# Patient Record
Sex: MALE | Race: WHITE | Employment: FULL TIME | URBAN - METROPOLITAN AREA
[De-identification: names, ages, dates, MRNs, and addresses within clinical notes are randomized per-mention and may not be internally consistent; named-entity substitution may affect disease eponyms.]

---

## 2018-07-02 ENCOUNTER — TRANSFERRED RECORDS (OUTPATIENT)
Dept: HEALTH INFORMATION MANAGEMENT | Facility: CLINIC | Age: 73
End: 2018-07-02

## 2018-07-03 ENCOUNTER — HOSPITAL ENCOUNTER (INPATIENT)
Facility: CLINIC | Age: 73
LOS: 6 days | Discharge: HOME | DRG: 444 | End: 2018-07-09
Attending: INTERNAL MEDICINE | Admitting: FAMILY MEDICINE
Payer: COMMERCIAL

## 2018-07-03 DIAGNOSIS — K81.0 ACUTE CHOLECYSTITIS: Primary | ICD-10-CM

## 2018-07-03 PROBLEM — K21.9 GERD (GASTROESOPHAGEAL REFLUX DISEASE): Status: ACTIVE | Noted: 2018-07-03

## 2018-07-03 PROBLEM — J84.9 CHRONIC INTERSTITIAL LUNG DISEASE (H): Status: ACTIVE | Noted: 2018-07-02

## 2018-07-03 PROBLEM — K81.9 CHOLECYSTITIS: Status: ACTIVE | Noted: 2018-07-03

## 2018-07-03 PROCEDURE — 93005 ELECTROCARDIOGRAM TRACING: CPT

## 2018-07-03 PROCEDURE — 93010 ELECTROCARDIOGRAM REPORT: CPT | Performed by: INTERNAL MEDICINE

## 2018-07-03 PROCEDURE — 25000128 H RX IP 250 OP 636: Performed by: STUDENT IN AN ORGANIZED HEALTH CARE EDUCATION/TRAINING PROGRAM

## 2018-07-03 PROCEDURE — 25800025 ZZH RX 258: Performed by: STUDENT IN AN ORGANIZED HEALTH CARE EDUCATION/TRAINING PROGRAM

## 2018-07-03 PROCEDURE — 12000008 ZZH R&B INTERMEDIATE UMMC

## 2018-07-03 RX ORDER — IPRATROPIUM BROMIDE AND ALBUTEROL SULFATE 2.5; .5 MG/3ML; MG/3ML
3 SOLUTION RESPIRATORY (INHALATION) DAILY PRN
Status: DISCONTINUED | OUTPATIENT
Start: 2018-07-03 | End: 2018-07-09 | Stop reason: HOSPADM

## 2018-07-03 RX ORDER — ONDANSETRON 4 MG/1
4 TABLET, ORALLY DISINTEGRATING ORAL EVERY 6 HOURS PRN
Status: DISCONTINUED | OUTPATIENT
Start: 2018-07-03 | End: 2018-07-09 | Stop reason: HOSPADM

## 2018-07-03 RX ORDER — BRIMONIDINE TARTRATE 1 MG/ML
1 SOLUTION/ DROPS OPHTHALMIC 2 TIMES DAILY
COMMUNITY
Start: 2016-02-09

## 2018-07-03 RX ORDER — CLOTRIMAZOLE AND BETAMETHASONE DIPROPIONATE 10; .64 MG/G; MG/G
CREAM TOPICAL 2 TIMES DAILY PRN
Status: DISCONTINUED | OUTPATIENT
Start: 2018-07-03 | End: 2018-07-09 | Stop reason: HOSPADM

## 2018-07-03 RX ORDER — ONDANSETRON 2 MG/ML
4 INJECTION INTRAMUSCULAR; INTRAVENOUS EVERY 6 HOURS PRN
Status: DISCONTINUED | OUTPATIENT
Start: 2018-07-03 | End: 2018-07-09 | Stop reason: HOSPADM

## 2018-07-03 RX ORDER — LATANOPROST 50 UG/ML
1 SOLUTION/ DROPS OPHTHALMIC AT BEDTIME
Status: DISCONTINUED | OUTPATIENT
Start: 2018-07-03 | End: 2018-07-04

## 2018-07-03 RX ORDER — DEXTROSE MONOHYDRATE, SODIUM CHLORIDE, AND POTASSIUM CHLORIDE 50; 1.49; 4.5 G/1000ML; G/1000ML; G/1000ML
INJECTION, SOLUTION INTRAVENOUS CONTINUOUS
Status: DISCONTINUED | OUTPATIENT
Start: 2018-07-03 | End: 2018-07-05

## 2018-07-03 RX ORDER — CLOTRIMAZOLE AND BETAMETHASONE DIPROPIONATE 10; .64 MG/G; MG/G
CREAM TOPICAL 2 TIMES DAILY PRN
COMMUNITY
Start: 2018-05-01

## 2018-07-03 RX ORDER — NALOXONE HYDROCHLORIDE 0.4 MG/ML
.1-.4 INJECTION, SOLUTION INTRAMUSCULAR; INTRAVENOUS; SUBCUTANEOUS
Status: DISCONTINUED | OUTPATIENT
Start: 2018-07-03 | End: 2018-07-09 | Stop reason: HOSPADM

## 2018-07-03 RX ORDER — LATANOPROST 50 UG/ML
1 SOLUTION/ DROPS OPHTHALMIC AT BEDTIME
Status: ON HOLD | COMMUNITY
Start: 2015-07-06 | End: 2018-07-04

## 2018-07-03 RX ORDER — HYDROMORPHONE HCL/0.9% NACL/PF 0.2MG/0.2
0.2 SYRINGE (ML) INTRAVENOUS
Status: DISCONTINUED | OUTPATIENT
Start: 2018-07-03 | End: 2018-07-09 | Stop reason: HOSPADM

## 2018-07-03 RX ORDER — BRIMONIDINE TARTRATE 1 MG/ML
1 SOLUTION/ DROPS OPHTHALMIC DAILY
Status: DISCONTINUED | OUTPATIENT
Start: 2018-07-04 | End: 2018-07-09 | Stop reason: HOSPADM

## 2018-07-03 RX ORDER — PIPERACILLIN SODIUM, TAZOBACTAM SODIUM 3; .375 G/15ML; G/15ML
3.38 INJECTION, POWDER, LYOPHILIZED, FOR SOLUTION INTRAVENOUS EVERY 8 HOURS SCHEDULED
Status: DISCONTINUED | OUTPATIENT
Start: 2018-07-03 | End: 2018-07-04

## 2018-07-03 RX ADMIN — PIPERACILLIN SODIUM AND TAZOBACTAM SODIUM 3.38 G: 3; .375 INJECTION, POWDER, LYOPHILIZED, FOR SOLUTION INTRAVENOUS at 20:46

## 2018-07-03 RX ADMIN — POTASSIUM CHLORIDE, DEXTROSE MONOHYDRATE AND SODIUM CHLORIDE: 150; 5; 450 INJECTION, SOLUTION INTRAVENOUS at 20:46

## 2018-07-03 RX ADMIN — Medication 0.2 MG: at 22:52

## 2018-07-03 RX ADMIN — Medication 0.2 MG: at 20:55

## 2018-07-03 ASSESSMENT — ENCOUNTER SYMPTOMS
EYES NEGATIVE: 1
CONSTIPATION: 0
NAUSEA: 0
FATIGUE: 0
VOMITING: 0
DIARRHEA: 0
APPETITE CHANGE: 1
HEADACHES: 0
WOUND: 1
ABDOMINAL DISTENTION: 0
CHILLS: 1
BLOOD IN STOOL: 0
ABDOMINAL PAIN: 1
DIAPHORESIS: 0
RESPIRATORY NEGATIVE: 1
SHORTNESS OF BREATH: 0
BACK PAIN: 1
ACTIVITY CHANGE: 1
FEVER: 0
CARDIOVASCULAR NEGATIVE: 1
PSYCHIATRIC NEGATIVE: 1

## 2018-07-03 ASSESSMENT — PAIN DESCRIPTION - DESCRIPTORS: DESCRIPTORS: SORE

## 2018-07-03 NOTE — IP AVS SNAPSHOT
MRN:[id number]                      After Visit Summary   7/3/2018    Abrahan Hagen    MRN: [id number]           Thank you!     Thank you for choosing [facility] for your care. Our goal is always to provide you with excellent care. Hearing back from our patients is one way we can continue to improve our services. Please take a few minutes to complete the written survey that you may receive in the mail after you visit with us. Thank you!        Patient Information     Date Of Birth          1945        Designated Caregiver       Most Recent Value    Caregiver    Will someone help with your care after discharge? yes    Name of designated caregiver Rebecca Hagen    Phone number of caregiver [phone]    Caregiver address [place], [place]      About your hospital stay     You were admitted on:  July 3, 2018 You last received care in the:  [address] [place] [address]    You were discharged on:  July 9, 2018        Reason for your hospital stay       You were admitted with an infection in your gallbladder. If you experience any additional abdominal pain or have a temperature above 100.4 at home you should call the gastroenterology service to get scheduled for a procedure.                  Who to Call     For medical emergencies, please call 911.  For non-urgent questions about your medical care, please call your primary care provider or clinic, [phone]          Attending Provider     Provider Specialty    Cherie Wilson MD Internal Medicine    Soheila Low DO Family Practice    Carlos Judd MD Family Practice       Primary Care Provider Office Phone # Fax #    Ananda Jones [phone]      After Care Instructions     Activity       Your activity upon discharge: activity as tolerated            Diet       Follow this diet upon discharge: Regular                  Follow-up Appointments     Adult [facility] Follow-up and recommended labs and tests       Follow up with primary care  "provider, Ananda Jones, within 7 days for hospital follow- up.  No follow up labs or test are needed.      Appointments on [place] and/or [facility] (with [facility] or [facility] provider or service). Call [phone] if you haven't heard regarding these appointments within 7 days of discharge.                  Pending Results     Date and Time Order Name Status Description    2018 210 Blood culture Preliminary     2018 210 Blood culture Preliminary             Statement of Approval     Ordered          18 1437  I have reviewed and agree with all the recommendations and orders detailed in this document.  EFFECTIVE NOW     Approved and electronically signed by:  David Saunders MD             Admission Information     Date & Time Provider Department Dept. Phone    7/3/2018 Carlos Judd MD [address] [facility] [phone]      Your Vitals Were     Blood Pressure Pulse Temperature Respirations Height Weight    116/65 (BP Location: Left arm) 71 97.3  F (36.3  C) (Oral) 16 1.727 m (5' 8\") 77.1 kg (170 lb)    Pulse Oximetry BMI (Body Mass Index)                100% 25.85 kg/m2          MyChart Information     [facility] lets you send messages to your doctor, view your test results, renew your prescriptions, schedule appointments and more. To sign up, go to [email or web address] . Click on \"Log in\" on the left side of the screen, which will take you to the Welcome page. Then click on \"Sign up Now\" on the right side of the page.     You will be asked to enter the access code listed below, as well as some personal information. Please follow the directions to create your username and password.     Your access code is: [email or web address]  Expires: 10/7/2018  2:44 PM     Your access code will  in 90 days. If you need help or a new code, please call your [place] clinic or [phone].        Care EveryWhere ID     This is your Care EveryWhere ID. This could be used by other organizations to access " your [facility] medical records  [id number]        Equal Access to Services     CIERRA CASTRO : Hadii norm ku hadroxannao Sonormaali, waaxda luqadaha, qaybta kaalmada dannyrohinirussel, waxay idiin haydarriansusan hamptonignaciaavril bell. So [facility] [phone].    ATENCIÓN: Si habla español, tiene a sweeney disposición servicios gratuitos de asistencia lingüística. Llame al [phone].    We comply with applicable federal civil rights laws and [place] laws. We do not discriminate on the basis of race, color, national origin, age, disability, sex, sexual orientation, or gender identity.               Review of your medicines      START taking        Dose / Directions    amoxicillin-clavulanate 500-125 MG per tablet   Commonly known as:  AUGMENTIN        Dose:  1 tablet   Take 1 tablet by mouth 2 times daily for 3 days   Quantity:  6 tablet   Refills:  0       oxyCODONE-acetaminophen 5-325 MG per tablet   Commonly known as:  PERCOCET        Dose:  1 tablet   Take 1 tablet by mouth every 6 hours as needed for pain   Quantity:  5 tablet   Refills:  0         CONTINUE these medicines which have NOT CHANGED        Dose / Directions    ALPHAGAN P 0.1 % ophthalmic solution   Generic drug:  brimonidine        Dose:  1 drop   Place 1 drop into both eyes daily   Refills:  0       clotrimazole-betamethasone cream   Commonly known as:  LOTRISONE        Apply topically 2 times daily as needed   Refills:  0       Ipratropium-Albuterol  MCG/ACT inhaler   Commonly known as:  COMBIVENT RESPIMAT        Dose:  1 puff   Inhale 1 puff into the lungs daily as needed   Refills:  0       LUMIGAN 0.01 % Soln   Generic drug:  bimatoprost        Dose:  1 drop   Place 1 drop into both eyes daily   Refills:  0            Where to get your medicines      These medications were sent to [facility] - [place], [place] - [address]  [address] [facility] [address] [facility] [address]     Phone:  [phone]     amoxicillin-clavulanate 500-125 MG per tablet          Some of these will need a paper prescription and others can be bought over the counter. Ask your nurse if you have questions.     Bring a paper prescription for each of these medications     oxyCODONE-acetaminophen 5-325 MG per tablet                Protect others around you: Learn how to safely use, store and throw away your medicines at www.disposemymeds.org.        ANTIBIOTIC INSTRUCTION     You've Been Prescribed an Antibiotic - Now What?  Your healthcare team thinks that you or your loved one might have an infection. Some infections can be treated with antibiotics, which are powerful, life-saving drugs. Like all medications, antibiotics have side effects and should only be used when necessary. There are some important things you should know about your antibiotic treatment.      Your healthcare team may run tests before you start taking an antibiotic.    Your team may take samples (e.g., from your blood, urine or other areas) to run tests to look for bacteria. These test can be important to determine if you need an antibiotic at all and, if you do, which antibiotic will work best.      Within a few days, your healthcare team might change or even stop your antibiotic.    Your team may start you on an antibiotic while they are working to find out what is making you sick.    Your team might change your antibiotic because test results show that a different antibiotic would be better to treat your infection.    In some cases, once your team has more information, they learn that you do not need an antibiotic at all. They may find out that you don't have an infection, or that the antibiotic you're taking won't work against your infection. For example, an infection caused by a virus can't be treated with antibiotics. Staying on an antibiotic when you don't need it is more likely to be harmful than helpful.      You may experience side effects from your antibiotic.    Like all medications, antibiotics have side  effects. Some of these can be serious.    Let you healthcare team know if you have any known allergies when you are admitted to the hospital.    One significant side effect of nearly all antibiotics is the risk of severe and sometimes deadly diarrhea caused by Clostridium difficile (C. Difficile). This occurs when a person takes antibiotics because some good germs are destroyed. Antibiotic use allows C. diificile to take over, putting patients at high risk for this serious infection.    As a patient or caregiver, it is important to understand your or your loved one's antibiotic treatment. It is especially important for caregivers to speak up when patients can't speak for themselves. Here are some important questions to ask your healthcare team.    What infection is this antibiotic treating and how do you know I have that infection?    What side effects might occur from this antibiotic?    How long will I need to take this antibiotic?    Is it safe to take this antibiotic with other medications or supplements (e.g., vitamins) that I am taking?     Are there any special directions I need to know about taking this antibiotic? For example, should I take it with food?    How will I be monitored to know whether my infection is responding to the antibiotic?    What tests may help to make sure the right antibiotic is prescribed for me?      Information provided by:  www.cdc.gov/getsmart  U.S. Department of Health and Human Services  Centers for disease Control and Prevention  National Center for Emerging and Zoonotic Infectious Diseases  Division of Healthcare Quality Promotion        Information about OPIOIDS     PRESCRIPTION OPIOIDS: WHAT YOU NEED TO KNOW   We gave you an opioid (narcotic) pain medicine. It is important to manage your pain, but opioids are not always the best choice. You should first try all the other options your care team gave you. Take this medicine for as short a time (and as few doses) as possible.      These medicines have risks:    DO NOT drive when on new or higher doses of pain medicine. These medicines can affect your alertness and reaction times, and you could be arrested for driving under the influence (DUI). If you need to use opioids long-term, talk to your care team about driving.    DO NOT operate heave machinery    DO NOT do any other dangerous activities while taking these medicines.     DO NOT drink any alcohol while taking these medicines.      If the opioid prescribed includes acetaminophen, DO NOT take with any other medicines that contain acetaminophen. Read all labels carefully. Look for the word  acetaminophen  or  Tylenol.  Ask your pharmacist if you have questions or are unsure.    You can get addicted to pain medicines, especially if you have a history of addiction (chemical, alcohol or substance dependence). Talk to your care team about ways to reduce this risk.    Store your pills in a secure place, locked if possible. We will not replace any lost or stolen medicine. If you don t finish your medicine, please throw away (dispose) as directed by your pharmacist. The [place] Pollution Control Agency has more information about safe disposal: https://www.pca.[facility].[place].us/living-green/managing-unwanted-medications.     All opioids tend to cause constipation. Drink plenty of water and eat foods that have a lot of fiber, such as fruits, vegetables, prune juice, apple juice and high-fiber cereal. Take a laxative (Miralax, milk of magnesia, Colace, Senna) if you don t move your bowels at least every other day.              Medication List: This is a list of all your medications and when to take them. Check marks below indicate your daily home schedule. Keep this list as a reference.      Medications           Morning Afternoon Evening Bedtime As Needed    ALPHAGAN P 0.1 % ophthalmic solution   Place 1 drop into both eyes daily   Last time this was given:  1 drop on 7/9/2018  9:06 AM   Generic  drug:  brimonidine                                amoxicillin-clavulanate 500-125 MG per tablet   Commonly known as:  AUGMENTIN   Take 1 tablet by mouth 2 times daily for 3 days                                clotrimazole-betamethasone cream   Commonly known as:  LOTRISONE   Apply topically 2 times daily as needed                                Ipratropium-Albuterol  MCG/ACT inhaler   Commonly known as:  COMBIVENT RESPIMAT   Inhale 1 puff into the lungs daily as needed                                LUMIGAN 0.01 % Soln   Place 1 drop into both eyes daily   Last time this was given:  1 drop on 7/9/2018  9:06 AM   Generic drug:  bimatoprost                                oxyCODONE-acetaminophen 5-325 MG per tablet   Commonly known as:  PERCOCET   Take 1 tablet by mouth every 6 hours as needed for pain

## 2018-07-03 NOTE — IP AVS SNAPSHOT
[address] [address]    Phone:  [phone]                                       After Visit Summary   7/3/2018    Abrahan Hagen    MRN: [id number]           After Visit Summary Signature Page     I have received my discharge instructions, and my questions have been answered. I have discussed any challenges I see with this plan with the nurse or doctor.    ..........................................................................................................................................  Patient/Patient Representative Signature      ..........................................................................................................................................  Patient Representative Print Name and Relationship to Patient    ..................................................               ................................................  Date                                            Time    ..........................................................................................................................................  Reviewed by Signature/Title    ...................................................              ..............................................  Date                                                            Time

## 2018-07-03 NOTE — PROGRESS NOTES
[facility]  Transfer Triage Note    Date of call: 07/03/18  Time of call: 9:49 AM    Reason for Transfer:Further diagnostic work up, management, and consultation for specialized care  Diagnosis: Cholecystitis    Outside Records: Not available, requested all records to be sent with the patient  Additional records requested to be faxed to [phone].    Stability of Patient: Patient is vitally stable, with no critical labs, and will likely remain stable throughout the transfer process    Expected Time of Arrival for Transfer: 0-8 hours    Recommendations for Management and Stabilization: Not needed    Additional Comments   73 yo M with hx of multiple surgeries at a young age for congenital defects for esophageal atresia, but now otherwise healthy man who is currently admitted at [facility] for acute cholecystitis. Due to his complicated anatomy, being transferred for further cares. Will need IR/GI/Surg to consult.  VSS  Labs wnl  On Zosyn  Requested all images to be sent on a CD.    Cherie MONTAÑO MD  Triage

## 2018-07-03 NOTE — LETTER
[address] [facility]  [address] [address]  Phone: [phone]    July 9, 2018        Abrahan Hagen  [address] [facility] [address]          To whom it may concern:    RE: Abrahan Hagen was hospitalized from 07/03/2018 to 07/09/2018. He may return to work with no medical restrictions as long as he feels able.    Please contact me for questions or concerns.      Sincerely,        Omid Cortés, DO

## 2018-07-04 ENCOUNTER — APPOINTMENT (OUTPATIENT)
Dept: NUCLEAR MEDICINE | Facility: CLINIC | Age: 73
DRG: 444 | End: 2018-07-04
Attending: STUDENT IN AN ORGANIZED HEALTH CARE EDUCATION/TRAINING PROGRAM
Payer: COMMERCIAL

## 2018-07-04 ENCOUNTER — APPOINTMENT (OUTPATIENT)
Dept: CARDIOLOGY | Facility: CLINIC | Age: 73
DRG: 444 | End: 2018-07-04
Attending: STUDENT IN AN ORGANIZED HEALTH CARE EDUCATION/TRAINING PROGRAM
Payer: COMMERCIAL

## 2018-07-04 ENCOUNTER — APPOINTMENT (OUTPATIENT)
Dept: GENERAL RADIOLOGY | Facility: CLINIC | Age: 73
DRG: 444 | End: 2018-07-04
Attending: INTERNAL MEDICINE
Payer: COMMERCIAL

## 2018-07-04 LAB
ALBUMIN SERPL-MCNC: 2.3 G/DL (ref 3.4–5)
ALP SERPL-CCNC: 87 U/L (ref 40–150)
ALT SERPL W P-5'-P-CCNC: 68 U/L (ref 0–70)
AMYLASE SERPL-CCNC: 38 U/L (ref 30–110)
ANION GAP SERPL CALCULATED.3IONS-SCNC: 7 MMOL/L (ref 3–14)
AST SERPL W P-5'-P-CCNC: 38 U/L (ref 0–45)
BASOPHILS # BLD AUTO: 0 10E9/L (ref 0–0.2)
BASOPHILS NFR BLD AUTO: 0.2 %
BILIRUB SERPL-MCNC: 0.9 MG/DL (ref 0.2–1.3)
BUN SERPL-MCNC: 12 MG/DL (ref 7–30)
CALCIUM SERPL-MCNC: 8.1 MG/DL (ref 8.5–10.1)
CHLORIDE SERPL-SCNC: 104 MMOL/L (ref 94–109)
CO2 SERPL-SCNC: 26 MMOL/L (ref 20–32)
CREAT SERPL-MCNC: 0.67 MG/DL (ref 0.66–1.25)
DIFFERENTIAL METHOD BLD: ABNORMAL
EOSINOPHIL # BLD AUTO: 0 10E9/L (ref 0–0.7)
EOSINOPHIL NFR BLD AUTO: 0.3 %
ERYTHROCYTE [DISTWIDTH] IN BLOOD BY AUTOMATED COUNT: 14.2 % (ref 10–15)
GFR SERPL CREATININE-BSD FRML MDRD: >90 ML/MIN/1.7M2
GLUCOSE SERPL-MCNC: 117 MG/DL (ref 70–99)
HCT VFR BLD AUTO: 37.5 % (ref 40–53)
HGB BLD-MCNC: 12.6 G/DL (ref 13.3–17.7)
IMM GRANULOCYTES # BLD: 0 10E9/L (ref 0–0.4)
IMM GRANULOCYTES NFR BLD: 0.2 %
INR PPP: 1.32 (ref 0.86–1.14)
LIPASE SERPL-CCNC: 36 U/L (ref 73–393)
LYMPHOCYTES # BLD AUTO: 0.8 10E9/L (ref 0.8–5.3)
LYMPHOCYTES NFR BLD AUTO: 8.4 %
MCH RBC QN AUTO: 29.9 PG (ref 26.5–33)
MCHC RBC AUTO-ENTMCNC: 33.6 G/DL (ref 31.5–36.5)
MCV RBC AUTO: 89 FL (ref 78–100)
MONOCYTES # BLD AUTO: 0.9 10E9/L (ref 0–1.3)
MONOCYTES NFR BLD AUTO: 10 %
NEUTROPHILS # BLD AUTO: 7.3 10E9/L (ref 1.6–8.3)
NEUTROPHILS NFR BLD AUTO: 80.9 %
NRBC # BLD AUTO: 0 10*3/UL
NRBC BLD AUTO-RTO: 0 /100
PLATELET # BLD AUTO: 132 10E9/L (ref 150–450)
POTASSIUM SERPL-SCNC: 3.7 MMOL/L (ref 3.4–5.3)
PROT SERPL-MCNC: 5.8 G/DL (ref 6.8–8.8)
RBC # BLD AUTO: 4.21 10E12/L (ref 4.4–5.9)
SODIUM SERPL-SCNC: 137 MMOL/L (ref 133–144)
WBC # BLD AUTO: 9.1 10E9/L (ref 4–11)

## 2018-07-04 PROCEDURE — 85610 PROTHROMBIN TIME: CPT | Performed by: STUDENT IN AN ORGANIZED HEALTH CARE EDUCATION/TRAINING PROGRAM

## 2018-07-04 PROCEDURE — 36415 COLL VENOUS BLD VENIPUNCTURE: CPT | Performed by: STUDENT IN AN ORGANIZED HEALTH CARE EDUCATION/TRAINING PROGRAM

## 2018-07-04 PROCEDURE — 82150 ASSAY OF AMYLASE: CPT | Performed by: STUDENT IN AN ORGANIZED HEALTH CARE EDUCATION/TRAINING PROGRAM

## 2018-07-04 PROCEDURE — 85025 COMPLETE CBC W/AUTO DIFF WBC: CPT | Performed by: STUDENT IN AN ORGANIZED HEALTH CARE EDUCATION/TRAINING PROGRAM

## 2018-07-04 PROCEDURE — 25000128 H RX IP 250 OP 636: Performed by: STUDENT IN AN ORGANIZED HEALTH CARE EDUCATION/TRAINING PROGRAM

## 2018-07-04 PROCEDURE — 93306 TTE W/DOPPLER COMPLETE: CPT

## 2018-07-04 PROCEDURE — 80053 COMPREHEN METABOLIC PANEL: CPT | Performed by: STUDENT IN AN ORGANIZED HEALTH CARE EDUCATION/TRAINING PROGRAM

## 2018-07-04 PROCEDURE — 34300033 ZZH RX 343: Performed by: FAMILY MEDICINE

## 2018-07-04 PROCEDURE — 71046 X-RAY EXAM CHEST 2 VIEWS: CPT

## 2018-07-04 PROCEDURE — 25800025 ZZH RX 258: Performed by: STUDENT IN AN ORGANIZED HEALTH CARE EDUCATION/TRAINING PROGRAM

## 2018-07-04 PROCEDURE — 25000128 H RX IP 250 OP 636: Performed by: FAMILY MEDICINE

## 2018-07-04 PROCEDURE — 93306 TTE W/DOPPLER COMPLETE: CPT | Mod: 26 | Performed by: INTERNAL MEDICINE

## 2018-07-04 PROCEDURE — 25000125 ZZHC RX 250: Performed by: STUDENT IN AN ORGANIZED HEALTH CARE EDUCATION/TRAINING PROGRAM

## 2018-07-04 PROCEDURE — A9537 TC99M MEBROFENIN: HCPCS | Performed by: FAMILY MEDICINE

## 2018-07-04 PROCEDURE — 25000132 ZZH RX MED GY IP 250 OP 250 PS 637: Performed by: STUDENT IN AN ORGANIZED HEALTH CARE EDUCATION/TRAINING PROGRAM

## 2018-07-04 PROCEDURE — 78226 HEPATOBILIARY SYSTEM IMAGING: CPT

## 2018-07-04 PROCEDURE — 25500064 ZZH RX 255 OP 636: Performed by: FAMILY MEDICINE

## 2018-07-04 PROCEDURE — 12000008 ZZH R&B INTERMEDIATE UMMC

## 2018-07-04 PROCEDURE — 83690 ASSAY OF LIPASE: CPT | Performed by: STUDENT IN AN ORGANIZED HEALTH CARE EDUCATION/TRAINING PROGRAM

## 2018-07-04 RX ORDER — MORPHINE SULFATE 2 MG/ML
2 INJECTION, SOLUTION INTRAMUSCULAR; INTRAVENOUS ONCE
Status: COMPLETED | OUTPATIENT
Start: 2018-07-04 | End: 2018-07-04

## 2018-07-04 RX ORDER — PIPERACILLIN SODIUM, TAZOBACTAM SODIUM 3; .375 G/15ML; G/15ML
3.38 INJECTION, POWDER, LYOPHILIZED, FOR SOLUTION INTRAVENOUS EVERY 6 HOURS
Status: DISCONTINUED | OUTPATIENT
Start: 2018-07-04 | End: 2018-07-09 | Stop reason: HOSPADM

## 2018-07-04 RX ORDER — KIT FOR THE PREPARATION OF TECHNETIUM TC 99M MEBROFENIN 45 MG/10ML
5-7 INJECTION, POWDER, LYOPHILIZED, FOR SOLUTION INTRAVENOUS ONCE
Status: COMPLETED | OUTPATIENT
Start: 2018-07-04 | End: 2018-07-04

## 2018-07-04 RX ADMIN — PIPERACILLIN SODIUM AND TAZOBACTAM SODIUM 3.38 G: 3; .375 INJECTION, POWDER, LYOPHILIZED, FOR SOLUTION INTRAVENOUS at 21:01

## 2018-07-04 RX ADMIN — POTASSIUM CHLORIDE, DEXTROSE MONOHYDRATE AND SODIUM CHLORIDE: 150; 5; 450 INJECTION, SOLUTION INTRAVENOUS at 23:29

## 2018-07-04 RX ADMIN — HUMAN ALBUMIN MICROSPHERES AND PERFLUTREN 5 ML: 10; .22 INJECTION, SOLUTION INTRAVENOUS at 11:00

## 2018-07-04 RX ADMIN — MORPHINE SULFATE 2 MG: 2 INJECTION, SOLUTION INTRAMUSCULAR; INTRAVENOUS at 19:21

## 2018-07-04 RX ADMIN — PIPERACILLIN SODIUM AND TAZOBACTAM SODIUM 3.38 G: 3; .375 INJECTION, POWDER, LYOPHILIZED, FOR SOLUTION INTRAVENOUS at 05:24

## 2018-07-04 RX ADMIN — BIMATOPROST 1 DROP: 0.1 SOLUTION/ DROPS OPHTHALMIC at 13:43

## 2018-07-04 RX ADMIN — BRIMONIDINE TARTRATE 1 DROP: 1 SOLUTION/ DROPS OPHTHALMIC at 13:43

## 2018-07-04 RX ADMIN — Medication 0.2 MG: at 22:45

## 2018-07-04 RX ADMIN — Medication 0.2 MG: at 03:53

## 2018-07-04 RX ADMIN — POTASSIUM CHLORIDE, DEXTROSE MONOHYDRATE AND SODIUM CHLORIDE: 150; 5; 450 INJECTION, SOLUTION INTRAVENOUS at 09:16

## 2018-07-04 RX ADMIN — Medication 0.2 MG: at 01:13

## 2018-07-04 RX ADMIN — Medication 0.2 MG: at 13:32

## 2018-07-04 RX ADMIN — SINCALIDE 1.5 MCG: 5 INJECTION, POWDER, LYOPHILIZED, FOR SOLUTION INTRAVENOUS at 17:36

## 2018-07-04 RX ADMIN — Medication 0.2 MG: at 07:50

## 2018-07-04 RX ADMIN — PIPERACILLIN SODIUM AND TAZOBACTAM SODIUM 3.38 G: 3; .375 INJECTION, POWDER, LYOPHILIZED, FOR SOLUTION INTRAVENOUS at 13:42

## 2018-07-04 RX ADMIN — MEBROFENIN 7 MILLICURIE: 45 INJECTION, POWDER, LYOPHILIZED, FOR SOLUTION INTRAVENOUS at 17:15

## 2018-07-04 ASSESSMENT — PAIN DESCRIPTION - DESCRIPTORS
DESCRIPTORS: DISCOMFORT;SORE
DESCRIPTORS: SHARP
DESCRIPTORS: SHARP
DESCRIPTORS: DISCOMFORT;SORE

## 2018-07-04 NOTE — CONSULTS
General Surgery Consultation Note    Abrahan Hagen  MRN: [id number]  male  72 year old    Chief Complaint:  R/o cholecystitis    HPI:  72 year old male h/o congenital esophageal atresia s/p colonic conduit, GERD, chronic ILD transferred from [facility] in [place] for concern of cholecystitis. Pt started c/o sharp RUQ pain early Monday morning w/ associated loss of appetite. Denies fevers, N/V; has normal BM/voiding. Pt underwent CT at [facility] which showed market gallbladder luminal distention w/ wall thickening but no gallstones as well as pancreatic divisum. Labs, including WBC, liver panel, & lipase, were all normal at this time. Zosyn was given. Due to patient's complex anatomy (suprasternal colonic conduit for esophageal atresia s/p 11 surgeries), decision was made to transfer to the  for further cares.    Patient Active Problem List   Diagnosis     Acute cholecystitis     Chronic interstitial lung disease (H)     Congenital atresia of esophagus     GERD (gastroesophageal reflux disease)     Hard of hearing     Inguinal hernia bilateral, non-recurrent     Pulmonary nodule     Past Surgical History:   Past Surgical History:   Procedure Laterality Date     ABDOMEN SURGERY      10 surgeries before 4yo for congenital atresia of esophagus, 11th surgery done in 1973 for ulceration of colonic conduit     APPENDECTOMY       HERNIA REPAIR      laparoscopic inguinal     Past Medical History:   Past Medical History:   Diagnosis Date     Chronic interstitial lung disease (H)      Congenital atresia of esophagus     s/p surgical repair w/ colonic conduit      GERD (gastroesophageal reflux disease)      Glaucoma      Inguinal hernia unilateral, non-recurrent     s/p repair     Social History:   Social History   Substance Use Topics     Smoking status: Former Smoker     Packs/day: 1.00     Years: 39.00     Smokeless tobacco: Former User     Quit date: 1/3/1999     Alcohol use Not on file      Comment: once in a  "great while, less than 1 per month     Family History:   Family History   Problem Relation Age of Onset     Diabetes Mother      Colon Cancer Father      Diabetes Maternal Grandfather         Allergies: No Known Allergies    Active Medications:   No current outpatient prescriptions on file.       ROS:  Otherwise negative    Physical Examination:   Vital Signs: /60  Pulse 87  Temp 97.9  F (36.6  C) (Oral)  Resp 18  Ht 1.727 m (5' 8\")  Wt 75.3 kg (166 lb 1.6 oz)  SpO2 95%  BMI 25.26 kg/m2  GEN: alert, healthy, no distress  EENT: normal  Chest: NLB on RA, regular rate  Abdomen: Soft, tender RUQ w/ deep palpation otherwise non-tender, non-distended  Extremities: WWP, no edema    Labs/Imaging/Other:  Liver (7/2): T bili 0.5, D bili 0.2, I bili 0.3  CBC (7/2): WBC 9.5      Assessment & Plan:  72 year old male h/o congenital esophageal atresia s/p colonic conduit, chronic iLD, transferred from [place] for concern of acute cholecystitis after CT showed gallbladder distension with wall thickening. Based on pt's VSS, normal liver panel and WBC, diagnosis of cholecystitis can be better established with a HIDA scan  - NPO at midnight  - HIDA scan tomorrow      D/w chief resident & staff    Dilia Montejo MD/MPH  Plastic Surgery PGY2\    I saw and evaluated the patient. I agree with the findings and the plan of care as documented in the resident s note.    Boy Grijalva MD    "

## 2018-07-04 NOTE — PLAN OF CARE
Problem: Patient Care Overview  Goal: Plan of Care/Patient Progress Review  Outcome: No Change  A+O. VSS. Abdominal pain radiating into ribs managed with prn Dilaudid. Up independently in room. Voiding in urinal. Chest xray and ECHO completed today. Awaiting HIDA scan - scheduled for 1730, no narcotics to be given prior to scan. NPO status. Not passing gas. Antibiotics given as ordered.   Continue with POC.

## 2018-07-04 NOTE — PROGRESS NOTES
[facility] Progress Note - [place]'s Service       Main Plans for Today   Continue zosyn   NPO for HIDA, then CLD to midnight   Plan for perc cholecystostomy tube w/ IR tomorrow      Assessment & Plan   Abrahan Hagen is a 72 year old male admitted on 7/3/2018. He has a history of congenital esophageal atresia w/ complex surgical hx and colonic conduit and is admitted for RUQ pain concerning for acute cholecystitis.     # Abdominal pain   # Acute cholecystitis   Patient has right upper quadrant abdominal pain with findings of acute cholecystitis on CT scan.  His labs are within normal limits without a white count or elevation of his LFTs.  He was transferred here due to his complex surgical history.  General surgery was consulted and is recommending a HIDA scan followed by a percutaneous cholecystostomy tube with interventional radiology.  They would consider surgical follow-up with a interval arthroscopic cholecystectomy versus open cholecystectomy.  -Continue IV Zosyn  -HIDA scan today  -N.p.o. at midnight for possible cholecystostomy tube  -Appreciate recommendations from surgical team  -Continue IV Dilaudid for pain    # Murmur   Patient reports history of a valvular abnormality in the past for which she saw a cardiologist.  He had a murmur on presentation here.  No other cardiac history.  Echocardiogram today showed normal ejection fraction with mild aortic stenosis.    # Stable chronic medical conditions  - Glaucoma: continue home eye drops  - Chronic interstitial lung disease: continue home Combivent inhaler PRN  - Groin itching: continue home Lotrisone cream PRN       # Pain Assessment:  Current Pain Score 7/4/2018   Patient currently in pain? yes   Pain score (0-10) -   Pain location Abdomen   Pain descriptors Sharp   - Abrahan is experiencing pain due to acute cholecystitis. Pain management was discussed and the plan was created in a collaborative fashion.   Abrahan's response to the current recommendations: engaged  - Please see the plan for pain management as documented above        Diet: NPO per Anesthesia Guidelines for Procedure/Surgery Except for: Meds  Fluids: D5 1/2 NS + 20 KCl @ 100ml/h  DVT Prophylaxis: Pneumatic Compression Devices  Code Status: Full Code    Disposition Plan   Expected discharge: 2 - 3 days, recommended to prior living arrangement once adequate pain management/ tolerating PO medications and acute cholecystitis managed. Dispo: Expected Discharge Date: 07/06/18        Entered: David Saunders 07/04/2018, 2:23 PM   Information in the above section will display in the discharge planner report.      The patient's care was discussed with the Attending Physician, Dr. Judd.    David Saunders  [facility]  Pager: [phone]  Please see sticky note for cross cover information    Interval History   No acute overnight events.  Patient denies any fevers or chills.  He continues to complain of right upper and lower quadrant abdominal pain.  He has not had no nausea or vomiting.    Physical Exam   Vital Signs: Temp: 98.8  F (37.1  C) Temp src: Oral BP: 121/63 Pulse: 83   Resp: 16 SpO2: 93 % O2 Device: None (Room air)    Weight: 166 lbs 6.4 oz  General Appearance: Awake, alert, NAD, comfortable   Respiratory: clear to auscultation, no rales, no rhonchi, no wheeze   Cardiovascular: regular rate and rhythm, murmur present, no amadeo, no rub   GI: abd w/ numerous surgical scars, soft, non-distended, RUQ and RLQ TTP w/ guarding  Skin: warm, dry        Data

## 2018-07-04 NOTE — PLAN OF CARE
"Problem: Pain, Acute (Adult)  Goal: Identify Related Risk Factors and Signs and Symptoms  Related risk factors and signs and symptoms are identified upon initiation of Human Response Clinical Practice Guideline (CPG).   Outcome: No Change  /48 (BP Location: Right arm)  Pulse 80  Temp 98.6  F (37  C) (Oral)  Resp 16  Ht 1.727 m (5' 8\")  Wt 75.3 kg (166 lb 1.6 oz)  SpO2 91%  BMI 25.26 kg/m2    A&Ox4. VSS on RA. Up ad monika. Abdominal pain managed with 0.2 mg IV dilaudid x2. NPO since midnight. Plan for HIDA scan today. Pt calls appropriately and is able to make needs known. He appeared to rest comfortably between cares. Will continue to monitor and follow POC.       "

## 2018-07-04 NOTE — H&P
[facility] History and Physical        Date of Admission:  7/3/2018  Date of Service: 7/3/2018         HPI      Chief Complaint   Acute cholecystitis    History is obtained from the patient and from EMR/outside hospital records.    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    Patient began feeling right upper quadrant abdominal pain at 2 AM on Monday morning.  Patient stated the pain woke him up from sleep. Patient attempted to work Monday morning, but could not work more than a few hours, as the pain was too severe. The pain was located in the right upper quadrant with radiation around to his back.  He describes the pain as constant and sharp,  and felt similar to the pain he had with appendicitis in the past.. Patient unable to identify if pain is associated with food, as patient has not eaten more than 1 small applesauce and 1 marcos cracker yesterday.  Pain was increased with deep breaths.  Patient denies having had pain in this location in the past.  Denies any nausea, vomiting, or fever.  Last bowel movement was Monday (yesterday), and was normal.  Patient's pain was initially rated about a 7 out of 10, but was well controlled with pain medicine at outside hospital.  His pain currently is 5 out of 10.    Patient was admitted at [facility] in [place], and was found to have acute cholecystitis on imaging.  CT scan done there showed marked gallbladder luminal distention with wall thickening, adjacent stranding, no gallstones, and pancreatic divisum.  Workup revealed no elevated white count, stable hemoglobin, no elevation in liver enzymes or alk phos, normal lipase.   Patient was treated with Zosyn 3.375g with the last dose, at 9 AM on July 3.  Patient also received oxycodone p.o., IV Dilaudid for pain control.  They were unable to perform ERCP, given patient's complex anatomy.  Patient has a history of congenital atresia of the esophagus, status post 11 surgeries, last one was in 1973.  He has a colonic conduit suprasternally.          History:   Review of Systems   The 10 point Review of Systems is negative other than noted in the HPI or here.   Review of Systems   Constitutional: Positive for activity change, appetite change and chills. Negative for diaphoresis, fatigue and fever.   HENT: Negative.    Eyes: Negative.    Respiratory: Negative.  Negative for shortness of breath (last felt short of breath yesterday, but resolved with home inhaler).    Cardiovascular: Negative.    Gastrointestinal: Positive for abdominal pain. Negative for abdominal distention, blood in stool, constipation, diarrhea, nausea and vomiting.   Genitourinary: Negative.    Musculoskeletal: Positive for back pain.   Skin: Positive for wound (chronic scratches on bilateral LE).   Neurological: Negative for headaches.   Psychiatric/Behavioral: Negative.      Past Medical History    I have reviewed this patient's medical history and updated it with pertinent information if needed.   Past Medical History:   Diagnosis Date     Chronic interstitial lung disease (H)      Congenital atresia of esophagus     s/p surgical repair w/ colonic conduit      GERD (gastroesophageal reflux disease)      Glaucoma      Inguinal hernia unilateral, non-recurrent     s/p repair      Past Surgical History   I have reviewed this patient's surgical history and updated it with pertinent information if needed.  Past Surgical History:   Procedure Laterality Date     ABDOMEN SURGERY      10 surgeries before 4yo for congenital atresia of esophagus, 11th surgery done in 1973 for ulceration of colonic conduit     APPENDECTOMY        HERNIA REPAIR      laparoscopic inguinal      Social History   Social History   Substance Use Topics     Smoking status: Former Smoker     Packs/day: 1.00     Years: 39.00     Smokeless tobacco: Former User     Quit date: 1/3/1999     Alcohol use Not on file      Comment: once in a great while, less than 1 per month     Family History   I have reviewed this patient's family history and updated it with pertinent information if needed.   Family History   Problem Relation Age of Onset     Diabetes Mother      Colon Cancer Father      Diabetes Maternal Grandfather      Prior to Admission Medications   Prior to Admission Medications   Prescriptions Last Dose Informant Patient Reported? Taking?   Ipratropium-Albuterol (COMBIVENT RESPIMAT)  MCG/ACT inhaler 7/3/2018 at 0800  Yes Yes   Sig: Inhale 1 puff into the lungs daily as needed   bimatoprost (LUMIGAN) 0.01 % SOLN Past Week at Unknown time  Yes Yes   Sig: Place 1 drop into both eyes daily   brimonidine (ALPHAGAN P) 0.1 % ophthalmic solution Unknown at Unknown time  Yes No   Sig: Place 1 drop into both eyes daily   clotrimazole-betamethasone (LOTRISONE) cream Past Week at Unknown time  Yes Yes   Sig: Apply topically 2 times daily as needed   latanoprost (XALATAN) 0.005 % ophthalmic solution Unknown at Unknown time  Yes No   Sig: Place 1 drop into both eyes At Bedtime      Facility-Administered Medications: None     Allergies   No Known Allergies        Physical Exam      Vital Signs: Temp: 97.9  F (36.6  C) Temp src: Oral BP: 117/60 Pulse: 87   Resp: 18 SpO2: 95 % O2 Device: None (Room air)    Weight: 166 lbs 1.6 oz    Physical Exam   Constitutional: He is oriented to person, place, and time. He appears well-developed and well-nourished. No distress.   HENT:   Nose: Nose normal.   Mouth/Throat: Oropharynx is clear and moist.   Eyes: Conjunctivae are normal. Right eye exhibits no discharge. Left eye exhibits no discharge.   Neck: Normal range of motion. Neck  supple. No tracheal deviation present.   Cardiovascular: Normal rate, regular rhythm and intact distal pulses.    Murmur heard.  Pulmonary/Chest: Effort normal. No respiratory distress. He has no wheezes.   Bibasilar fine inspiratory crackles, with some left upper lobe fine inspiratory crackles. Suprasternal colonic conduit palpable with air.   Abdominal: Soft. Bowel sounds are normal. He exhibits no distension. There is tenderness (RUQ, positive Edwina's). There is guarding. There is no rebound.   Musculoskeletal: He exhibits no edema.   Neurological: He is alert and oriented to person, place, and time.   Skin: Skin is dry. No rash noted. He is not diaphoretic.   Psychiatric: He has a normal mood and affect. His behavior is normal. Judgment and thought content normal.   Nursing note and vitals reviewed.    Assessment & Plan      Abrahan Hagen is a 72 year old male admitted on 7/3/2018. 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    # Acute cholecystitis  Patient was transferred from [facility] for management of acute cholecystitis.  Patient was seen by surgery at the outside hospital, but recommended transferring to [facility]  given his complex surgical anatomy.  Patient is hemodynamically stable, afebrile, no elevated white count, no abnormal liver enzymes with a normal alk phos, and normal lipase.  On CT scan it showed gallbladder luminal distention with wall thickening, consistent with cholecystitis, without gallstones.  - Continue abx: Zosyn 3.375mg Q8H  - Continue pain control with IV dilaudid 0.2mg Q2H PRN  - CLD until midnight,  NPO at midnight  - IVF: D5, 1/2 NS + 20K at 85mL/h  - Surgery consulted  - HIDA scan in AM    # Murmur  Patient describes having seen a cardiologist in the past, states he is  "diagnosed with \"arterial insufficiency\".  When asked if it is aortic insufficiency, patient denies hearing aortic before and only arterial.  He denies having a significant cardiac history otherwise.  - Obtain outside records  - Echocardiogram ordered for AM    # Stable chronic medical conditions  - Glaucoma: continue home eye drops  - Chronic interstitial lung disease: continue home Combivent inhaler PRN  - Groin itching: continue home Lotrisone cream PRN    # Pain Assessment:  Current Pain Score 7/3/2018   Patient currently in pain? yes   Pain score (0-10) 5   Pain location Abdomen   Pain descriptors Sore   - Abrahan is experiencing pain due to acute cholecystitis. Pain management was discussed and the plan was created in a collaborative fashion.  Abrahan's response to the current recommendations: engaged  - Please see the plan for pain management as documented above    Diet: NPO per Anesthesia Guidelines for Procedure/Surgery Except for: Meds  Fluids: D5 1/2 NS + 20K at 100mL/h  DVT Prophylaxis: Pneumatic Compression Devices  Code Status: Full Code    Disposition Plan   Expected discharge: 2 - 3 days; recommended to prior living arrangement once adequate pain management/ tolerating PO medications, antibiotic plan established and surgical plan established. Dispo: Expected Discharge Date: 07/06/18      Entered: Viri Cloud 07/03/2018, 9:31 PM   Information in the above section will display in the discharge planner report.    The patient was examined by and discussed with Dr. Soheila P[facility]ta's Family Medicine Inpatient Service  [facility]   Pager: [phone]  Please see sticky note for cross cover information      Results:     Data   Data   No lab results found in last 7 days.  No results found for this or any previous visit (from the past 24 hour(s)).      "

## 2018-07-04 NOTE — PLAN OF CARE
Problem: Patient Care Overview  Goal: Plan of Care/Patient Progress Review  Outcome: No Change  ADMISSION    Admitted this pt from OSH ([facility]) via EMS. Alert and oriented x 4. Ambulated from stretcher to bed with SBA of 1. VSS, afebrile. Pt verbalized pain is manageable. Oriented to room, unit, call light. Hand outs provided per unit policy. Pre-existing PIV-SL right LA. Lung sounds posterior with some crackles. Skin intact with some scratches to legs and arms. PLAN: Pain management and work up.

## 2018-07-04 NOTE — PROGRESS NOTES
"Subjective: No acute issues since admission. Pain well controlled. Patients only c/o RUQ abdominal pain. - BM.    Objective:   /63 (BP Location: Left arm)  Pulse 83  Temp 98.8  F (37.1  C) (Oral)  Resp 16  Ht 1.727 m (5' 8\")  Wt 75.5 kg (166 lb 6.4 oz)  SpO2 93%  BMI 25.3 kg/m2    PE:  Gen: Awake, alert, NAD   Resp: non-labored at rest  Abd: Soft, non-distended, RUQ tenderness to palpation, + Marcelo sign, multiple abdominal scars near epigastrium from previous surgeries  Ext: warm and well perfused    I/O last 3 completed shifts:  In: 321.67 [I.V.:321.67]  Out: 375 [Urine:375] - Last 24 hours      Labs/Imaging  Heme:  Recent Labs  Lab 07/04/18  0710   WBC 9.1   HGB 12.6*   *     Chem:  Recent Labs  Lab 07/04/18  0710   POTASSIUM 3.7   CR 0.67         A/P: Abrahan Hagen is a 72 year old male, w/ a h/o esophagus repair with a colonic conduit, GERD, chronic interstitial lung disease, and glaucoma who presented as a consult to our service with RUQ abdominal pain. He presented from an OSH with imaging consistent with cholecystitis. Due patient's surgical history it will be discussed with the patient his options. Plan for HIDA today and will reevaluate subsequently.      NEURO Pain well controlled on Dilaudid PRN.  Changes:  None    CV HDS.    PULM Aggressive pulmonary toilet and I/S.   FEN/GI mIVF 85 ml/hr   Continue NPO     No acute issues, good UOP    HEME Hgb as above.  No transfusions indicated at this time   ID Afebrile, no leukocytosis. Trend WBC and trend LFTs  Follow up amylase, lipase  Antibiotics: Zosyn day #2    ENDO No issues   ACTIVITY Up as tolerated    PPx SCDs, ambulation   DISPO Anticipate d/c home/       Discussed with Dr. De Souza, chief resident who will staff with Dr. Reardon.    Lalito Ontiveros MD on 7/4/2018 at 1:17 PM  PGY-1, General Surgery  Pager: [phone]    "

## 2018-07-05 ENCOUNTER — APPOINTMENT (OUTPATIENT)
Dept: GENERAL RADIOLOGY | Facility: CLINIC | Age: 73
DRG: 444 | End: 2018-07-05
Attending: STUDENT IN AN ORGANIZED HEALTH CARE EDUCATION/TRAINING PROGRAM
Payer: COMMERCIAL

## 2018-07-05 LAB
ALBUMIN SERPL-MCNC: 2.4 G/DL (ref 3.4–5)
ALBUMIN SERPL-MCNC: 2.4 G/DL (ref 3.4–5)
ALP SERPL-CCNC: 97 U/L (ref 40–150)
ALP SERPL-CCNC: 98 U/L (ref 40–150)
ALT SERPL W P-5'-P-CCNC: 49 U/L (ref 0–70)
ALT SERPL W P-5'-P-CCNC: 49 U/L (ref 0–70)
ANION GAP SERPL CALCULATED.3IONS-SCNC: 7 MMOL/L (ref 3–14)
AST SERPL W P-5'-P-CCNC: 26 U/L (ref 0–45)
AST SERPL W P-5'-P-CCNC: 26 U/L (ref 0–45)
BASOPHILS # BLD AUTO: 0 10E9/L (ref 0–0.2)
BASOPHILS NFR BLD AUTO: 0.1 %
BILIRUB DIRECT SERPL-MCNC: 0.1 MG/DL (ref 0–0.2)
BILIRUB SERPL-MCNC: 0.9 MG/DL (ref 0.2–1.3)
BILIRUB SERPL-MCNC: 1 MG/DL (ref 0.2–1.3)
BUN SERPL-MCNC: 11 MG/DL (ref 7–30)
CALCIUM SERPL-MCNC: 8.5 MG/DL (ref 8.5–10.1)
CHLORIDE SERPL-SCNC: 104 MMOL/L (ref 94–109)
CO2 SERPL-SCNC: 25 MMOL/L (ref 20–32)
CREAT SERPL-MCNC: 0.71 MG/DL (ref 0.66–1.25)
CRP SERPL-MCNC: 210 MG/L (ref 0–8)
DIFFERENTIAL METHOD BLD: ABNORMAL
EOSINOPHIL # BLD AUTO: 0.1 10E9/L (ref 0–0.7)
EOSINOPHIL NFR BLD AUTO: 0.5 %
ERYTHROCYTE [DISTWIDTH] IN BLOOD BY AUTOMATED COUNT: 13.8 % (ref 10–15)
ERYTHROCYTE [DISTWIDTH] IN BLOOD BY AUTOMATED COUNT: 14 % (ref 10–15)
GFR SERPL CREATININE-BSD FRML MDRD: >90 ML/MIN/1.7M2
GLUCOSE SERPL-MCNC: 117 MG/DL (ref 70–99)
HCT VFR BLD AUTO: 36.7 % (ref 40–53)
HCT VFR BLD AUTO: 38 % (ref 40–53)
HGB BLD-MCNC: 12.1 G/DL (ref 13.3–17.7)
HGB BLD-MCNC: 12.7 G/DL (ref 13.3–17.7)
IMM GRANULOCYTES # BLD: 0 10E9/L (ref 0–0.4)
IMM GRANULOCYTES NFR BLD: 0.1 %
INTERPRETATION ECG - MUSE: NORMAL
LACTATE BLD-SCNC: 0.9 MMOL/L (ref 0.7–2)
LACTATE BLD-SCNC: NORMAL MMOL/L (ref 0.7–2)
LYMPHOCYTES # BLD AUTO: 1 10E9/L (ref 0.8–5.3)
LYMPHOCYTES NFR BLD AUTO: 10.3 %
MCH RBC QN AUTO: 29.7 PG (ref 26.5–33)
MCH RBC QN AUTO: 29.7 PG (ref 26.5–33)
MCHC RBC AUTO-ENTMCNC: 33 G/DL (ref 31.5–36.5)
MCHC RBC AUTO-ENTMCNC: 33.4 G/DL (ref 31.5–36.5)
MCV RBC AUTO: 89 FL (ref 78–100)
MCV RBC AUTO: 90 FL (ref 78–100)
MONOCYTES # BLD AUTO: 0.8 10E9/L (ref 0–1.3)
MONOCYTES NFR BLD AUTO: 9.1 %
NEUTROPHILS # BLD AUTO: 7.4 10E9/L (ref 1.6–8.3)
NEUTROPHILS NFR BLD AUTO: 79.9 %
NRBC # BLD AUTO: 0 10*3/UL
NRBC BLD AUTO-RTO: 0 /100
PLATELET # BLD AUTO: 116 10E9/L (ref 150–450)
PLATELET # BLD AUTO: 137 10E9/L (ref 150–450)
POTASSIUM SERPL-SCNC: 3.9 MMOL/L (ref 3.4–5.3)
PROT SERPL-MCNC: 6.4 G/DL (ref 6.8–8.8)
PROT SERPL-MCNC: 6.5 G/DL (ref 6.8–8.8)
RBC # BLD AUTO: 4.08 10E12/L (ref 4.4–5.9)
RBC # BLD AUTO: 4.27 10E12/L (ref 4.4–5.9)
SODIUM SERPL-SCNC: 136 MMOL/L (ref 133–144)
WBC # BLD AUTO: 9.2 10E9/L (ref 4–11)
WBC # BLD AUTO: 9.8 10E9/L (ref 4–11)

## 2018-07-05 PROCEDURE — 25800025 ZZH RX 258: Performed by: STUDENT IN AN ORGANIZED HEALTH CARE EDUCATION/TRAINING PROGRAM

## 2018-07-05 PROCEDURE — 25000128 H RX IP 250 OP 636: Performed by: FAMILY MEDICINE

## 2018-07-05 PROCEDURE — 25000128 H RX IP 250 OP 636: Performed by: STUDENT IN AN ORGANIZED HEALTH CARE EDUCATION/TRAINING PROGRAM

## 2018-07-05 PROCEDURE — 36415 COLL VENOUS BLD VENIPUNCTURE: CPT | Performed by: STUDENT IN AN ORGANIZED HEALTH CARE EDUCATION/TRAINING PROGRAM

## 2018-07-05 PROCEDURE — 12000001 ZZH R&B MED SURG/OB UMMC

## 2018-07-05 PROCEDURE — 83605 ASSAY OF LACTIC ACID: CPT | Performed by: FAMILY MEDICINE

## 2018-07-05 PROCEDURE — 87040 BLOOD CULTURE FOR BACTERIA: CPT | Performed by: STUDENT IN AN ORGANIZED HEALTH CARE EDUCATION/TRAINING PROGRAM

## 2018-07-05 PROCEDURE — 85027 COMPLETE CBC AUTOMATED: CPT | Performed by: STUDENT IN AN ORGANIZED HEALTH CARE EDUCATION/TRAINING PROGRAM

## 2018-07-05 PROCEDURE — 85025 COMPLETE CBC W/AUTO DIFF WBC: CPT | Performed by: STUDENT IN AN ORGANIZED HEALTH CARE EDUCATION/TRAINING PROGRAM

## 2018-07-05 PROCEDURE — 86140 C-REACTIVE PROTEIN: CPT | Performed by: STUDENT IN AN ORGANIZED HEALTH CARE EDUCATION/TRAINING PROGRAM

## 2018-07-05 PROCEDURE — 71045 X-RAY EXAM CHEST 1 VIEW: CPT

## 2018-07-05 PROCEDURE — 80053 COMPREHEN METABOLIC PANEL: CPT | Performed by: STUDENT IN AN ORGANIZED HEALTH CARE EDUCATION/TRAINING PROGRAM

## 2018-07-05 PROCEDURE — 25000132 ZZH RX MED GY IP 250 OP 250 PS 637: Performed by: STUDENT IN AN ORGANIZED HEALTH CARE EDUCATION/TRAINING PROGRAM

## 2018-07-05 PROCEDURE — 83605 ASSAY OF LACTIC ACID: CPT | Performed by: STUDENT IN AN ORGANIZED HEALTH CARE EDUCATION/TRAINING PROGRAM

## 2018-07-05 PROCEDURE — 80076 HEPATIC FUNCTION PANEL: CPT | Performed by: STUDENT IN AN ORGANIZED HEALTH CARE EDUCATION/TRAINING PROGRAM

## 2018-07-05 RX ORDER — DEXTROSE MONOHYDRATE, SODIUM CHLORIDE, AND POTASSIUM CHLORIDE 50; 1.49; 4.5 G/1000ML; G/1000ML; G/1000ML
INJECTION, SOLUTION INTRAVENOUS CONTINUOUS
Status: DISCONTINUED | OUTPATIENT
Start: 2018-07-05 | End: 2018-07-09 | Stop reason: HOSPADM

## 2018-07-05 RX ADMIN — POTASSIUM CHLORIDE, DEXTROSE MONOHYDRATE AND SODIUM CHLORIDE: 150; 5; 450 INJECTION, SOLUTION INTRAVENOUS at 22:52

## 2018-07-05 RX ADMIN — ONDANSETRON HYDROCHLORIDE 4 MG: 2 INJECTION, SOLUTION INTRAMUSCULAR; INTRAVENOUS at 14:53

## 2018-07-05 RX ADMIN — Medication 0.2 MG: at 01:02

## 2018-07-05 RX ADMIN — BIMATOPROST 1 DROP: 0.1 SOLUTION/ DROPS OPHTHALMIC at 09:04

## 2018-07-05 RX ADMIN — Medication 1 MG: at 01:02

## 2018-07-05 RX ADMIN — Medication 0.2 MG: at 14:40

## 2018-07-05 RX ADMIN — PIPERACILLIN SODIUM AND TAZOBACTAM SODIUM 3.38 G: 3; .375 INJECTION, POWDER, LYOPHILIZED, FOR SOLUTION INTRAVENOUS at 14:43

## 2018-07-05 RX ADMIN — BRIMONIDINE TARTRATE 1 DROP: 1 SOLUTION/ DROPS OPHTHALMIC at 09:03

## 2018-07-05 RX ADMIN — PIPERACILLIN SODIUM AND TAZOBACTAM SODIUM 3.38 G: 3; .375 INJECTION, POWDER, LYOPHILIZED, FOR SOLUTION INTRAVENOUS at 21:34

## 2018-07-05 RX ADMIN — PIPERACILLIN SODIUM AND TAZOBACTAM SODIUM 3.38 G: 3; .375 INJECTION, POWDER, LYOPHILIZED, FOR SOLUTION INTRAVENOUS at 02:46

## 2018-07-05 RX ADMIN — Medication 0.2 MG: at 20:26

## 2018-07-05 RX ADMIN — Medication 0.2 MG: at 17:05

## 2018-07-05 RX ADMIN — PIPERACILLIN SODIUM AND TAZOBACTAM SODIUM 3.38 G: 3; .375 INJECTION, POWDER, LYOPHILIZED, FOR SOLUTION INTRAVENOUS at 09:04

## 2018-07-05 ASSESSMENT — PAIN DESCRIPTION - DESCRIPTORS
DESCRIPTORS: SHARP;STABBING
DESCRIPTORS: CONSTANT;SHARP
DESCRIPTORS: SHARP

## 2018-07-05 NOTE — PLAN OF CARE
"Problem: Patient Care Overview  Goal: Plan of Care/Patient Progress Review  Outcome: No Change  /65 (BP Location: Left arm)  Pulse 82  Temp 99.7  F (37.6  C) (Oral)  Resp 16  Ht 1.727 m (5' 8\")  Wt 75.5 kg (166 lb 6.4 oz)  SpO2 92%  BMI 25.3 kg/m2    A/Ox4. VSS on RA. NPO for IR procedure tomorrow. Up independently. Pt had HIDA scan this evening. Reports pain in RUQ; IV Dilaudid given x1 after scan; also received IV morphine during scan. Denies nausea. Adequate urine output. No BM this shift. R PIV with MIVF @ 85mL/hr. Will continue to monitor and notify the team of any acute changes.       "

## 2018-07-05 NOTE — PLAN OF CARE
Problem: Patient Care Overview  Goal: Plan of Care/Patient Progress Review  Outcome: No Change  Neuro: A&Ox4.   Cardiac: VSS, T max 98.5.   Respiratory: Sating 93% on RA.  GI/: Adequate urine output. BM X1  Diet/appetite: Tolerating CLD diet w/advancement to reg this afternoon. Eating small amts, denies pain or nausea.  Activity:  Up ad monika.  Pain: At acceptable level on current regimen, declined any pain interventions.   Skin: No new deficits noted.  LDA's: PIV SL, fluids d/c'd this afternoon. Intermittent abx.    Plan: Scheduled IR procedure postponed for the time being, continue to monitor abdominal pain and nausea and abxs. Possible d/c in next 2-3 days. Continue with POC. Notify primary team with changes..      Problem: Pain, Acute (Adult)  Goal: Identify Related Risk Factors and Signs and Symptoms  Related risk factors and signs and symptoms are identified upon initiation of Human Response Clinical Practice Guideline (CPG).   Outcome: No Change   07/05/18 0800   Pain, Acute   Related Risk Factors (Acute Pain) disease process;infection;persistent pain   Signs and Symptoms (Acute Pain) verbalization of pain descriptors

## 2018-07-05 NOTE — PROGRESS NOTES
"Subjective: No acute issues overnight. Pain well controlled, he reports it is a 6/10 which is about the same as yesterday. Denies any nausea. He was started on clears this morning. Adequate UOP. He reports BM overnight.    Objective:   /63 (BP Location: Left arm)  Pulse 76  Temp 98.5  F (36.9  C) (Oral)  Resp 16  Ht 1.727 m (5' 8\")  Wt 75.5 kg (166 lb 6.4 oz)  SpO2 93%  BMI 25.3 kg/m2    PE:  Gen: Awake, alert, NAD   Resp: non-labored at rest  Abd: Soft, non-distended, RUQ TTP  Ext: warm and well perfused    I/O last 3 completed shifts:  In: 2940.5 [I.V.:2940.5]  Out: 2125 [Urine:2125] - Last 24 hours      Labs/Imaging  Heme:  Recent Labs  Lab 07/05/18  0659 07/04/18  0710   WBC 9.8 9.1   HGB 12.7* 12.6*   * 132*     Chem:  Recent Labs  Lab 07/04/18  0710   POTASSIUM 3.7   CR 0.67       Assessment/Recommendations:  Abrahan Hagen is a 72 year old male w/ h/o esophagus repair with colonic conduit, GERD, chronic interstitial lung disease, and glaucoma who presented as a consult to our service with RUQ abdominal pain. HIDA conducted yesterday did showed a lack of gallbladder filling consistent with cholecystitis. Given extensive previous surgeries and complex anatomy, cholecystectomy is not recommended at this time. Plan is to attempt to improve pain and treat cholecystitis with abx. The case was discussed with GI and their input is appreciated. The plan for conservative management at this time was discussed with patient at bedside.     - continue IV abx  - agree with current pain regimen   - agree with CLD  - no plans for surgical intervention at this time  - rest of cares per primary       NEURO Pain well controlled on Dilaudid PRN  Changes:  None    CV HDS.    PULM Aggressive pulmonary toilet and I/S.   FEN/GI mIVF 85   Continue current diet     No acute issues, good UOP    HEME Hgb as above. Postop anemia expected for this surgery.  Continue to monitor. No transfusions indicated at this time "   ID Afebrile, no leukocytosis.    Antibiotics: Zosyn    ENDO No issues   ACTIVITY Up as tolerated  Ambulate at least TID    PPx SCDs, ambulation   DISPO Anticipate d/c home.       Discussed with Dr. De Souza, chief resident who will staff with Dr. Reardon.    Tim Muñiz, MS4    Lalito Ontiveros MD on 7/5/2018 at 9:55 AM  PGY-1, General Surgery  Pager: [phone]

## 2018-07-05 NOTE — PROGRESS NOTES
Attestation:  This patient has been seen and evaluated by me, Carlos Judd on 7/5/2018.  I saw and discussed the case with the primary resident and the care team. I agree with the findings and plan in this note. I have reviewed today's vital signs, medications, laboratory results and imaging results.    [facility]          [facility] Progress Note - [facility]       Main Plans for Today   Continue zosyn   ADAT   Monitor pain   Repeat labs in am     Assessment & Plan   Abrahan Hagen is a 72 year old male admitted on 7/3/2018. He has a history of congenital esophageal atresia w/ complex surgical hx and colonic conduit and is admitted for RUQ pain concerning for acute cholecystitis.     # Abdominal pain   # Acute cholecystitis   Patient has right upper quadrant abdominal pain with findings of acute cholecystitis on CT scan and HIDA.  His labs are within normal limits without a white count or elevation of his LFTs.  He was transferred here due to his complex surgical history.  General surgery was consulted and recommended a percutaneous cholecystostomy tube with interventional radiology, but after further discussion with radiology they are recommending antibiotics and trial of diet prior to any procedural intervention.   -Continue IV Zosyn  -ADAT   -Re-check labs in am   -Appreciate recommendations from surgical team  -Continue IV Dilaudid for pain    # Murmur   Patient reports history of a valvular abnormality in the past for which he saw a cardiologist.  He had a murmur on presentation here.  No other cardiac history.  Echocardiogram showed normal ejection fraction with mild aortic stenosis.    # Stable chronic medical conditions  - Glaucoma: continue home eye drops  - Chronic interstitial lung disease: continue home Combivent inhaler PRN  - Groin itching: continue home Lotrisone cream PRN       # Pain  Assessment:  Current Pain Score 7/5/2018   Patient currently in pain? yes   Pain score (0-10) -   Pain location Abdomen   Pain descriptors Constant;Sharp   - Abrahan is experiencing pain due to acute cholecystitis. Pain management was discussed and the plan was created in a collaborative fashion.  Abrahan's response to the current recommendations: engaged  - Please see the plan for pain management as documented above        Diet: Regular Diet Adult  Fluids: D5 1/2 NS + 20 KCl @ 100ml/h  DVT Prophylaxis: Pneumatic Compression Devices  Code Status: Full Code    Disposition Plan   Expected discharge: 2 - 3 days, recommended to prior living arrangement once adequate pain management/ tolerating PO medications and acute cholecystitis managed. Dispo: Expected Discharge Date: 07/08/18        Entered: David Saunders 07/05/2018, 12:17 PM   Information in the above section will display in the discharge planner report.      The patient's care was discussed with the Attending Physician, Dr. Judd.    David Saunders  [facility]  Pager: [phone]  Please see sticky note for cross cover information    Interval History   No acute overnight events.  Patient denies any fevers or chills.  He continues to complain of right upper and lower quadrant abdominal pain which he states is a 4/10.  He has not had no diarrhea, nausea, or vomiting. No chest pain, shortness of breath.     Physical Exam   Vital Signs: Temp: 98.5  F (36.9  C) Temp src: Oral BP: 110/63 Pulse: 76   Resp: 16 SpO2: 93 % O2 Device: None (Room air)    Weight: 166 lbs 6.4 oz  General Appearance: Awake, alert, NAD, comfortable   Respiratory: clear to auscultation, no rales, no rhonchi, no wheeze   Cardiovascular: regular rate and rhythm, murmur present, no amadeo, no rub   GI: abd w/ numerous surgical scars, soft, non-distended, RUQ and RLQ TTP w/ guarding  Skin: warm, dry        Data

## 2018-07-05 NOTE — CONSULTS
IR consulted for possible perc cholecystostomy tube placement.    After discussion with Dr. Reardon this AM, surgery does not feel the percutaneous tube is necessary. Please re-consult IR if patient condition changes.    Zoe Azul DNP, APRN  Interventional Radiology   Pager: [phone]

## 2018-07-05 NOTE — PLAN OF CARE
Problem: Pain, Acute (Adult)  Goal: Identify Related Risk Factors and Signs and Symptoms  Related risk factors and signs and symptoms are identified upon initiation of Human Response Clinical Practice Guideline (CPG).   Temp max 99.7o. OVSS. Pain maintained at tolerable level with IV dilaudid x1. NPO for IR procedure today. MIVF infusion maintained/IV abx admin per order. Up independently. Void spontaneous. UOP wdl. Rested btwn cares. Continue POC.   07/05/18 0649   Pain, Acute   Related Risk Factors (Acute Pain) disease process;persistent pain   Signs and Symptoms (Acute Pain) fatigue/weakness;verbalization of pain descriptors

## 2018-07-06 LAB
ALBUMIN SERPL-MCNC: 2.2 G/DL (ref 3.4–5)
ALP SERPL-CCNC: 111 U/L (ref 40–150)
ALT SERPL W P-5'-P-CCNC: 47 U/L (ref 0–70)
AST SERPL W P-5'-P-CCNC: 28 U/L (ref 0–45)
BILIRUB DIRECT SERPL-MCNC: 0.2 MG/DL (ref 0–0.2)
BILIRUB SERPL-MCNC: 0.7 MG/DL (ref 0.2–1.3)
ERYTHROCYTE [DISTWIDTH] IN BLOOD BY AUTOMATED COUNT: 14 % (ref 10–15)
GLUCOSE BLDC GLUCOMTR-MCNC: 131 MG/DL (ref 70–99)
HCT VFR BLD AUTO: 35.4 % (ref 40–53)
HGB BLD-MCNC: 11.6 G/DL (ref 13.3–17.7)
MCH RBC QN AUTO: 29.5 PG (ref 26.5–33)
MCHC RBC AUTO-ENTMCNC: 32.8 G/DL (ref 31.5–36.5)
MCV RBC AUTO: 90 FL (ref 78–100)
PLATELET # BLD AUTO: 119 10E9/L (ref 150–450)
PROT SERPL-MCNC: 6.4 G/DL (ref 6.8–8.8)
RBC # BLD AUTO: 3.93 10E12/L (ref 4.4–5.9)
WBC # BLD AUTO: 10.1 10E9/L (ref 4–11)

## 2018-07-06 PROCEDURE — 80076 HEPATIC FUNCTION PANEL: CPT | Performed by: STUDENT IN AN ORGANIZED HEALTH CARE EDUCATION/TRAINING PROGRAM

## 2018-07-06 PROCEDURE — 85027 COMPLETE CBC AUTOMATED: CPT | Performed by: STUDENT IN AN ORGANIZED HEALTH CARE EDUCATION/TRAINING PROGRAM

## 2018-07-06 PROCEDURE — 25000128 H RX IP 250 OP 636: Performed by: FAMILY MEDICINE

## 2018-07-06 PROCEDURE — 36415 COLL VENOUS BLD VENIPUNCTURE: CPT | Performed by: STUDENT IN AN ORGANIZED HEALTH CARE EDUCATION/TRAINING PROGRAM

## 2018-07-06 PROCEDURE — 25000132 ZZH RX MED GY IP 250 OP 250 PS 637: Performed by: STUDENT IN AN ORGANIZED HEALTH CARE EDUCATION/TRAINING PROGRAM

## 2018-07-06 PROCEDURE — 94640 AIRWAY INHALATION TREATMENT: CPT

## 2018-07-06 PROCEDURE — 12000001 ZZH R&B MED SURG/OB UMMC

## 2018-07-06 PROCEDURE — 25000125 ZZHC RX 250: Performed by: STUDENT IN AN ORGANIZED HEALTH CARE EDUCATION/TRAINING PROGRAM

## 2018-07-06 PROCEDURE — 00000146 ZZHCL STATISTIC GLUCOSE BY METER IP

## 2018-07-06 PROCEDURE — 40000275 ZZH STATISTIC RCP TIME EA 10 MIN

## 2018-07-06 PROCEDURE — 25800025 ZZH RX 258: Performed by: STUDENT IN AN ORGANIZED HEALTH CARE EDUCATION/TRAINING PROGRAM

## 2018-07-06 PROCEDURE — 25000128 H RX IP 250 OP 636: Performed by: STUDENT IN AN ORGANIZED HEALTH CARE EDUCATION/TRAINING PROGRAM

## 2018-07-06 RX ADMIN — Medication 1 MG: at 23:27

## 2018-07-06 RX ADMIN — BIMATOPROST 1 DROP: 0.1 SOLUTION/ DROPS OPHTHALMIC at 07:34

## 2018-07-06 RX ADMIN — POTASSIUM CHLORIDE, DEXTROSE MONOHYDRATE AND SODIUM CHLORIDE: 150; 5; 450 INJECTION, SOLUTION INTRAVENOUS at 07:33

## 2018-07-06 RX ADMIN — PIPERACILLIN SODIUM AND TAZOBACTAM SODIUM 3.38 G: 3; .375 INJECTION, POWDER, LYOPHILIZED, FOR SOLUTION INTRAVENOUS at 09:25

## 2018-07-06 RX ADMIN — PIPERACILLIN SODIUM AND TAZOBACTAM SODIUM 3.38 G: 3; .375 INJECTION, POWDER, LYOPHILIZED, FOR SOLUTION INTRAVENOUS at 04:15

## 2018-07-06 RX ADMIN — Medication 0.2 MG: at 00:48

## 2018-07-06 RX ADMIN — PIPERACILLIN SODIUM AND TAZOBACTAM SODIUM 3.38 G: 3; .375 INJECTION, POWDER, LYOPHILIZED, FOR SOLUTION INTRAVENOUS at 22:42

## 2018-07-06 RX ADMIN — IPRATROPIUM BROMIDE AND ALBUTEROL SULFATE 3 ML: .5; 3 SOLUTION RESPIRATORY (INHALATION) at 01:26

## 2018-07-06 RX ADMIN — Medication 0.2 MG: at 04:16

## 2018-07-06 RX ADMIN — Medication 0.2 MG: at 21:59

## 2018-07-06 RX ADMIN — BRIMONIDINE TARTRATE 1 DROP: 1 SOLUTION/ DROPS OPHTHALMIC at 07:34

## 2018-07-06 RX ADMIN — POTASSIUM CHLORIDE, DEXTROSE MONOHYDRATE AND SODIUM CHLORIDE: 150; 5; 450 INJECTION, SOLUTION INTRAVENOUS at 18:25

## 2018-07-06 RX ADMIN — PIPERACILLIN SODIUM AND TAZOBACTAM SODIUM 3.38 G: 3; .375 INJECTION, POWDER, LYOPHILIZED, FOR SOLUTION INTRAVENOUS at 15:51

## 2018-07-06 ASSESSMENT — PAIN DESCRIPTION - DESCRIPTORS
DESCRIPTORS: SHARP
DESCRIPTORS: SORE
DESCRIPTORS: SORE
DESCRIPTORS: SHARP
DESCRIPTORS: SHARP

## 2018-07-06 NOTE — PLAN OF CARE
Problem: Patient Care Overview  Goal: Plan of Care/Patient Progress Review  Outcome: Therapy, progress toward functional goals is gradual    Alert, oriented. T 99.6, OVSS, on 2L nc (sats 88 on RA).  Infrequent productive cough. Lungs clear, diminished LLL. Neb 1x, helpful.  Pt has interstitial lung disease and associated dyspnea.  States it's difficult to breathe normally because abdominal pain is worse with deeper breaths.    Abdominal pain in RUQ, sharp in nature, appears to be getting a little better.  IV dilaudid 2x.  IV abx.  Abdomen rounded, soft, no gas/BM overnight.  Voiding adequate amts.   Up with SBA, a little unsteady, bed alarm for safety.

## 2018-07-06 NOTE — PROGRESS NOTES
Surgery Consult Progress Note  Abrahan Hagen  [id number]    S:  Patient had fever overnight and stabbing pain in RUQ that has returned to baseline. He is doing well this morning. Pain is well controlled today at 3/10 (down from 7/10 last night). Reports BM yesterday for the first time since last Sunday, 7/1. Denies nausea, vomiting, fever, chills, SOB. Slept well.     O:  Temp:  [99.1  F (37.3  C)-101.7  F (38.7  C)] 100.3  F (37.9  C)  Pulse:  [77-92] 80  Resp:  [16-28] 16  BP: (109-120)/(54-59) 110/59  SpO2:  [85 %-97 %] 96 %    I/O last 3 completed shifts:  In: 1704.58 [P.O.:380; I.V.:1324.58]  Out: 1400 [Urine:1400]    Gen: awake, alert, NAD  Resp: non-labored breathing on nasal canula  Abd: soft, minimally TTP RUQ, non-distended  Ext: warm and well perfused  Incisions: well healed surgical scars noted in the epigastric region    BMP  Recent Labs  Lab 07/05/18 2124 07/04/18  0710    137   POTASSIUM 3.9 3.7   CHLORIDE 104 104   JEFFRY 8.5 8.1*   CO2 25 26   BUN 11 12   CR 0.71 0.67   * 117*     CBC    Recent Labs  Lab 07/06/18  0756 07/05/18 2124 07/05/18  0659 07/04/18  0710   WBC 10.1 9.2 9.8 9.1   RBC 3.93* 4.08* 4.27* 4.21*   HGB 11.6* 12.1* 12.7* 12.6*   HCT 35.4* 36.7* 38.0* 37.5*   MCV 90 90 89 89   MCH 29.5 29.7 29.7 29.9   MCHC 32.8 33.0 33.4 33.6   RDW 14.0 13.8 14.0 14.2   * 116* 137* 132*     INR  Recent Labs  Lab 07/04/18  0710   INR 1.32*              A/P: Abrahan aHgen is a 72 year old male w/ h/o esophagus repair with colonic conduit, GERD, chronic interstitial lung disease, and glaucoma who presented as a consult to our service with RUQ abdominal pain. HIDA conducted 7/3 showed a lack of gallbladder filling consistent with cholecystitis. Given extensive previous surgeries and complex anatomy, cholecystectomy is not recommended at this time. The case was discussed with GI and their input is appreciated. Most likely long term solution would be endoscopic intervention with placement  of axial stent between stomach and gallbladder. Plan remains conservative with good pain control and IV abx.   - To OR M with Dr. Juárez  - Recommend regular diet   - continue IV abx and current pain regimen   - rest of cares per primary     Olayinka Abraham, MS3    Resident/Fellow Attestation   I, Lalito Ontiveros, I have verified the history and personally performed the physical exam and medical decision making.  I agree with the assessment and plan of care as documented in the note.      Lalito Ontiveros MD  PGY1, General Surgery  Pager: [phone]

## 2018-07-06 NOTE — PROGRESS NOTES
"UPDATE: July 5, 2018 @ 9:21 PM     S: Patient had fever to 101.7, increasing abdominal pain, desaturation to 85% (stable now on 2L via NC with saturation 94%).  He had been advancing diet today, was able to eat half of his dinner, had increased reflux symptoms and abdominal pain afterwards.  Reports no vomiting or overt aspiration event, no cough, he does not feel short of breath but does feel feverish.     O:  /57  Pulse 92  Temp 101.7  F (38.7  C) (Oral)  Resp 16  Ht 1.727 m (5' 8\")  Wt 75.5 kg (166 lb 6.4 oz)  SpO2 93%  BMI 25.3 kg/m2    General: Awake, alert, in no acute distress  Resp: Lungs clear to auscultation bilaterally  Cardio: Regular rate and rhythm. No murmur appreciated  Abdomen: Multiple prior surgical scars.  Increased tenderness in RUQ, + guarding of RUQ.  No rebound, no rigidity of rest of abdomen.         Lab Results   Component Value Date    WBC 9.8 07/05/2018    HGB 12.7 (L) 07/05/2018    HCT 38.0 (L) 07/05/2018     (L) 07/05/2018     07/04/2018    POTASSIUM 3.7 07/04/2018    CHLORIDE 104 07/04/2018    CO2 26 07/04/2018    BUN 12 07/04/2018    CR 0.67 07/04/2018     (H) 07/04/2018    AST 26 07/05/2018    ALT 49 07/05/2018    ALKPHOS 98 07/05/2018    BILITOTAL 1.0 07/05/2018    INR 1.32 (H) 07/04/2018       A/P:    # Acute cholecystitis  Had CT scan and HIDA consistent with cholecystitis.  He had no leukocytosis or jaundice or hyperbilirubinemia, no LFT elevations.  Due to complex surgical history, was evaluated for possible IR percutaneous drainage today, was deemed not necessary given patient's normal labs and vitals and tolerating increasing diet at time.  Now with fever, increased RUQ pain, hypoxia.  - Blood Cx x2  - CBC, CMP, lactic acid  - CXR  - Is on IV zosyn since admission  - NPO, start IVF D5 1/2NS K20 @ 125 ml/hr, boluses as necessary  - Paged general surgery resident, discussed case, will evaluate and discuss, appreciate recommendations    Ananda " MD David, MPH  PGY-3 [facility]  Pager: [phone]

## 2018-07-06 NOTE — PROGRESS NOTES
[facility], [facility] Progress Note - Kathryn's Service       Main Plans for Today   Continue zosyn   Monitor pain   Repeat labs in am   Plan for OR w/ GI on Monday     Assessment & Plan   Abrahan Hagen is a 72 year old male admitted on 7/3/2018. He has a history of congenital esophageal atresia w/ complex surgical hx and colonic conduit and is admitted for RUQ pain concerning for acute cholecystitis.     # Abdominal pain   # Acute cholecystitis   Patient has right upper quadrant abdominal pain with findings of acute cholecystitis on CT scan and HIDA.  His labs are within normal limits without a white count or elevation of his LFTs.  He was transferred here due to his complex surgical history. Patient failed conservative management with abx and advancement of diet with worsened pain and fever. Surgery has discussed with GI and plans for endoscopic drainage of GB through stomach.   -Continue IV Zosyn  -Continue IV Dilaudid for pain  -Re-check labs in am   -GI consult   -Appreciate recommendations from surgical team    # Murmur   Patient reports history of a valvular abnormality in the past for which he saw a cardiologist.  He had a murmur on presentation here.  No other cardiac history.  Echocardiogram showed normal ejection fraction with mild aortic stenosis.    # Stable chronic medical conditions  - Glaucoma: continue home eye drops  - Chronic interstitial lung disease: continue home Combivent inhaler PRN  - Groin itching: continue home Lotrisone cream PRN     # Pain Assessment:  Current Pain Score 7/6/2018   Patient currently in pain? yes   Pain score (0-10) 3   Pain location Abdomen   Pain descriptors -   - Abrahan is experiencing pain due to acute cholecystitis. Pain management was discussed and the plan was created in a collaborative fashion.  Abrahan's response to the current recommendations: engaged  - Please see the plan for pain management as documented above    Diet:  NPO for Medical/Clinical Reasons Except for: Meds, Ice Chips  Fluids: D5 1/2 NS + 20 KCl @ 100ml/h  DVT Prophylaxis: Pneumatic Compression Devices  Code Status: Full Code    Disposition Plan   Expected discharge: 2 - 3 days, recommended to prior living arrangement once adequate pain management/ tolerating PO medications and acute cholecystitis managed. Dispo: Expected Discharge Date: 07/09/18        Entered: David Saunders 07/06/2018, 3:55 PM   Information in the above section will display in the discharge planner report.      The patient's care was discussed with the Attending Physician, Dr. Judd.    David Saunders  [facility] Medicine  Pager: [phone]  Please see sticky note for cross cover information    Interval History   Last night had increased pain and fever with eating. No other overnight events.   He continues to complain of right upper and lower quadrant abdominal pain which he states is a 3/10.  He has not had no diarrhea, nausea, or vomiting. No chest pain, shortness of breath. This morning he is quite irritated that no further procedural treatments have been planned.     Physical Exam   Vital Signs: Temp: 99.3  F (37.4  C) Temp src: Oral BP: 107/52 Pulse: 77   Resp: 18 SpO2: 93 % O2 Device: Nasal cannula Oxygen Delivery: 2 LPM  Weight: 165 lbs 0 oz  General Appearance: Awake, alert, NAD, comfortable   Respiratory: clear to auscultation, no rales, no rhonchi, no wheeze   Cardiovascular: regular rate and rhythm, murmur present, no amadeo, no rub   GI: abd w/ numerous surgical scars, soft, non-distended, RUQ and RLQ TTP w/ guarding  Skin: warm, dry        Data

## 2018-07-06 NOTE — PLAN OF CARE
Problem: Patient Care Overview  Goal: Plan of Care/Patient Progress Review  Outcome: No Change  VSS. Requires 2L oxygen via nasal cannula to maintain sats >90%. Declines pain meds. IV Zosyn given. NPO with ice chips. Voiding in urinal, no BM.   Patient hopeful about planned GI procedure. Patient's wife at bedside. Continue with POC.

## 2018-07-06 NOTE — CONSULTS
GASTROENTEROLOGY CONSULTATION      Date of Admission:  7/3/2018           Reason for Consultation:   We were asked by Dr. Judd of Family Medicine to evaluate this patient with cholecystitis.           ASSESSMENT AND RECOMMENDATIONS:   Assessment:  72 year old male with a history of chronic interstitial lung disease, congenital atresia of the esophagus status post colonic conduit repair, inguinal hernia repair, who was transferred from [facility] on 7/3/2018 for management of acute cholecystitis.  Patient is clinically stable on antibiotics however not tolerating oral intake.  Concur with diagnosis of acute cholecystitis based on clinical findings and HIDA scan.  Given inability to undergo cholecystectomy recommend transgastric gallbladder drainage.  We will tentatively plan for Monday, 7/9/2018.       Recommendations  -Check INR Sunday with goal less than 1.5  -Monitor BMP, LFTs and CBC  -Continue antibiotics for cholecystitis  -N.p.o.-Simone evening  -Tentative plan for transgastric gallbladder drainage monday    Gastroenterology outpatient follow up recommendations: pending clinical course    Thank you for involving us in this patient's care. Please do not hesitate to contact the GI service with any questions or concerns.     Pt care plan discussed with Dr. Juárez, GI staff physician.    Swapnil Nguyễn  -------------------------------------------------------------------------------------------------------------------           History of Present Illness:   Abrahan Hagen is a 72 year old male with a history of chronic interstitial lung disease, congenital atresia of the esophagus status post colonic conduit repair, inguinal hernia repair, who was transferred from [facility] on 7/3/2018 for management of acute cholecystitis.    Per discussion with primary team and surgery the patient was diagnosed with acute cholecystitis however given complicated surgical history is not a candidate for  "cholecystectomy.  Gastroenterology was consulted for potential therapeutic options.      Consultation patient reports right upper quadrant pain with no radiation is described as sharp constant felt \"similar to when appendicitis\".  Medical management was attempted with antibiotics however patient could not tolerate diet.  Patient reported to have a fever of 101.7 a day prior to consultation.  He reports constipation.  Does not feel he could go home in his current state.            Past Medical History:   Reviewed and edited as appropriate  Past Medical History:   Diagnosis Date     Chronic interstitial lung disease (H)      Congenital atresia of esophagus     s/p surgical repair w/ colonic conduit      GERD (gastroesophageal reflux disease)      Glaucoma      Inguinal hernia unilateral, non-recurrent     s/p repair            Past Surgical History:   Reviewed and edited as appropriate   Past Surgical History:   Procedure Laterality Date     ABDOMEN SURGERY      10 surgeries before 6yo for congenital atresia of esophagus, 11th surgery done in 1973 for ulceration of colonic conduit     APPENDECTOMY       HERNIA REPAIR      laparoscopic inguinal            Previous Endoscopy:   No results found for this or any previous visit.         Social History:   Reviewed and edited as appropriate  Social History     Social History     Marital status:      Spouse name: N/A     Number of children: N/A     Years of education: N/A     Occupational History     Not on file.     Social History Main Topics     Smoking status: Former Smoker     Packs/day: 1.00     Years: 39.00     Smokeless tobacco: Former User     Quit date: 1/3/1999     Alcohol use Not on file      Comment: once in a great while, less than 1 per month     Drug use: Not on file     Sexual activity: No     Other Topics Concern     Not on file     Social History Narrative    Lives in a house with grandson, daughter, granddaughter + her boyfriend. No help at home.     " "Works in an [employer]/ [employer].     2 daughter (one daughter he does not speak to)            Family History:   Reviewed and edited as appropriate  Family History   Problem Relation Age of Onset     Diabetes Mother      Colon Cancer Father      Diabetes Maternal Grandfather         No known history of colorectal cancer, liver disease, or inflammatory bowel disease.       Allergies:   Reviewed and edited as appropriate   No Known Allergies         Medications:     Prescriptions Prior to Admission   Medication Sig Dispense Refill Last Dose     bimatoprost (LUMIGAN) 0.01 % SOLN Place 1 drop into both eyes daily   Past Week at Unknown time     clotrimazole-betamethasone (LOTRISONE) cream Apply topically 2 times daily as needed   Past Week at Unknown time     Ipratropium-Albuterol (COMBIVENT RESPIMAT)  MCG/ACT inhaler Inhale 1 puff into the lungs daily as needed   7/3/2018 at 0800     brimonidine (ALPHAGAN P) 0.1 % ophthalmic solution Place 1 drop into both eyes daily   Unknown at Unknown time             Review of Systems:   A complete review of systems was performed and is negative except as noted in the HPI           Physical Exam:   /54 (BP Location: Left arm)  Pulse 73  Temp 98.3  F (36.8  C) (Oral)  Resp 20  Ht 1.727 m (5' 8\")  Wt 74.8 kg (165 lb)  SpO2 95%  BMI 25.09 kg/m2  Wt:   Wt Readings from Last 2 Encounters:   07/06/18 74.8 kg (165 lb)      Constitutional: cooperative, pleasant, not dyspneic/diaphoretic, no acute distress  Eyes: Sclera anicteric/injected  Ears/nose/mouth/throat: Normal oropharynx without ulcers or exudate, mucus membranes moist, hearing intact  Neck: supple, thyroid normal size  CV: No edema  Respiratory: Unlabored breathing  Lymph: No axillary, submandibular, supraclavicular or inguinal lymphadenopathy  Abd: soft, Nondistended, significant surgical scarring +bs, tender to palpation in the right upper quadrant, no peritoneal signs  Skin: warm, " perfused, no jaundice  Neuro: AAO x 3, No asterixis  Psych: Normal affect  MSK: No gross deformities         Data:   Labs and imaging below were independently reviewed and interpreted    BMP  Recent Labs  Lab 07/05/18 2124 07/04/18  0710    137   POTASSIUM 3.9 3.7   CHLORIDE 104 104   JEFFRY 8.5 8.1*   CO2 25 26   BUN 11 12   CR 0.71 0.67   * 117*     CBC  Recent Labs  Lab 07/06/18 0756 07/05/18 2124 07/05/18 0659 07/04/18  0710   WBC 10.1 9.2 9.8 9.1   RBC 3.93* 4.08* 4.27* 4.21*   HGB 11.6* 12.1* 12.7* 12.6*   HCT 35.4* 36.7* 38.0* 37.5*   MCV 90 90 89 89   MCH 29.5 29.7 29.7 29.9   MCHC 32.8 33.0 33.4 33.6   RDW 14.0 13.8 14.0 14.2   * 116* 137* 132*     INR  Recent Labs  Lab 07/04/18  0710   INR 1.32*     LFTs  Recent Labs  Lab 07/06/18 0756 07/05/18 2124 07/05/18 0659 07/04/18  0710   ALKPHOS 111 97 98 87   AST 28 26 26 38   ALT 47 49 49 68   BILITOTAL 0.7 0.9 1.0 0.9   PROTTOTAL 6.4* 6.5* 6.4* 5.8*   ALBUMIN 2.2* 2.4* 2.4* 2.3*      PANC  Recent Labs  Lab 07/04/18  0710   LIPASE 36*   AMYLASE 38       Imaging:   HIDA scan 7/3/2018  Impression:  The gallbladder does not fill following administration of morphine.  This is consistent with cholecystitis.

## 2018-07-06 NOTE — PROVIDER NOTIFICATION
MD notified. T 101.7 oral. Oxygen sat 85 % RA. Put on 2 L NC. Sat 93%. Unsteady. Bed alarm initiated.

## 2018-07-06 NOTE — PLAN OF CARE
Problem: Patient Care Overview  Goal: Plan of Care/Patient Progress Review  Outcome: No Change  Up with SBA of 1, ambulating, voiding in the urinal with adequate urine volume. NPO ice chips. Pt verbalized abdominal pain decreased and manageable. Decline pain medication IV dilaudid. OVSS, afebrile, needing couple of O2 to keep sats above 92%. Pt have some interstitial lung disease have some SOB with exertion. Text page primary team requesting to order pt's home inhaler medication. No BM this shift. IV antibiotics and IVF at 125 cc/hr. PLAN: To continue with the care plan.

## 2018-07-06 NOTE — PLAN OF CARE
Problem: Patient Care Overview  Goal: Plan of Care/Patient Progress Review  Outcome: No Change  T max 101.7 oral. Oxygen sat 85 % RA OVSS. Patient put on 02 2L sat up 93%. Patient unsteady on feet. Bed alarm activated. MD notified. Patient voided adequate amount. Patient stated he did not tolerated dinner. Abdominal pain treated c Dilaudid IV. Patient now NPO. MIV @ 125 ml/hr. CXR and labs drawn. Continue c antibiotics. Continue c POC.

## 2018-07-06 NOTE — PROGRESS NOTES
"SURGERY CROSS-COVER NOTE  07/05/2018 10:29 PM    Patient: Abrahan Hagen  MRN: [id number]    Was notified by FM resident regarding patient having increased RUQ, increasing O2 demand, and a T 101.7  Assessed Abrahan Hagen at the bedside.    SUBJECTIVE  Patient states he notices an increase in RUQ pain around 6 pm just after he had eaten his dinner. Pain worse when he coughs. Pain is intermittent, \"stabbing\" and 7/10. Denies any shortness of breath, chest pain, leg edema, palpitations,  fevers, chills, nausea, vomiting, or weakness. Nasal canula was noted to only be partially inserted in nares and patient said he was having a difficult time keeping it in.    OBJECTIVE  Temp:  [98.5  F (36.9  C)-101.7  F (38.7  C)] 101.7  F (38.7  C)  Pulse:  [76-92] 92  Resp:  [16] 16  BP: (110-111)/(57-63) 111/57  SpO2:  [85 %-93 %] 93 %     Physical Exam:  General: AAO, NAD, lying in bed, appears comfortable  CV: RRR, no murmurs  Pulm: CTAB, breathing comfortably on 2L NC.  Abd: soft, non-distended, tender to palpation in the RUQ, Marcelo sign positive. No rebound tenderness, rigidity, or guarding. Multiple well healed incisions noted on the abdomen.  Extremities: warm, well-perfused without edema  Neuro: No focal deficits noted, patient moves all extremities spontaneously    Intake/Output Summary (Last 24 hours) at 07/05/18 2229  Last data filed at 07/05/18 2134   Gross per 24 hour   Intake             2240 ml   Output             1350 ml   Net              890 ml     Labs:  WBC: 9.2  Lac: 0.9  ASSESSMENT/PLAN  Abrahan Hagen is a 72 year old male with acute cholecystitis, treated with medical management, that presents with increased RUQ pain, T 101.7, and increasing oxygen demands. Will continue to monitor for changes in patient's condition, VS, or pain. No acute surgical intervention necessary at this time.    The shawn resident, Dr. Montejo was notified of this assessment and agree to the aforementioned plan.      Eric Benoit, " MD  General Surgery (PGY-1)  [phone] (pager)

## 2018-07-07 ENCOUNTER — APPOINTMENT (OUTPATIENT)
Dept: GENERAL RADIOLOGY | Facility: CLINIC | Age: 73
DRG: 444 | End: 2018-07-07
Attending: STUDENT IN AN ORGANIZED HEALTH CARE EDUCATION/TRAINING PROGRAM
Payer: COMMERCIAL

## 2018-07-07 LAB
ALBUMIN SERPL-MCNC: 2.1 G/DL (ref 3.4–5)
ALP SERPL-CCNC: 131 U/L (ref 40–150)
ALT SERPL W P-5'-P-CCNC: 61 U/L (ref 0–70)
ANION GAP SERPL CALCULATED.3IONS-SCNC: 7 MMOL/L (ref 3–14)
AST SERPL W P-5'-P-CCNC: 50 U/L (ref 0–45)
BASOPHILS # BLD AUTO: 0 10E9/L (ref 0–0.2)
BASOPHILS NFR BLD AUTO: 0.1 %
BILIRUB SERPL-MCNC: 0.5 MG/DL (ref 0.2–1.3)
BUN SERPL-MCNC: 8 MG/DL (ref 7–30)
CALCIUM SERPL-MCNC: 8.3 MG/DL (ref 8.5–10.1)
CHLORIDE SERPL-SCNC: 103 MMOL/L (ref 94–109)
CO2 SERPL-SCNC: 28 MMOL/L (ref 20–32)
CREAT SERPL-MCNC: 0.76 MG/DL (ref 0.66–1.25)
DIFFERENTIAL METHOD BLD: ABNORMAL
EOSINOPHIL # BLD AUTO: 0.1 10E9/L (ref 0–0.7)
EOSINOPHIL NFR BLD AUTO: 0.5 %
ERYTHROCYTE [DISTWIDTH] IN BLOOD BY AUTOMATED COUNT: 14.2 % (ref 10–15)
GFR SERPL CREATININE-BSD FRML MDRD: >90 ML/MIN/1.7M2
GLUCOSE SERPL-MCNC: 154 MG/DL (ref 70–99)
HCT VFR BLD AUTO: 34.2 % (ref 40–53)
HGB BLD-MCNC: 11 G/DL (ref 13.3–17.7)
IMM GRANULOCYTES # BLD: 0.1 10E9/L (ref 0–0.4)
IMM GRANULOCYTES NFR BLD: 0.6 %
LYMPHOCYTES # BLD AUTO: 1.3 10E9/L (ref 0.8–5.3)
LYMPHOCYTES NFR BLD AUTO: 12.7 %
MAGNESIUM SERPL-MCNC: 2.2 MG/DL (ref 1.6–2.3)
MCH RBC QN AUTO: 29 PG (ref 26.5–33)
MCHC RBC AUTO-ENTMCNC: 32.2 G/DL (ref 31.5–36.5)
MCV RBC AUTO: 90 FL (ref 78–100)
MONOCYTES # BLD AUTO: 1.1 10E9/L (ref 0–1.3)
MONOCYTES NFR BLD AUTO: 11 %
MRSA DNA SPEC QL NAA+PROBE: NEGATIVE
NEUTROPHILS # BLD AUTO: 7.7 10E9/L (ref 1.6–8.3)
NEUTROPHILS NFR BLD AUTO: 75.1 %
NRBC # BLD AUTO: 0 10*3/UL
NRBC BLD AUTO-RTO: 0 /100
PHOSPHATE SERPL-MCNC: 2.1 MG/DL (ref 2.5–4.5)
PLATELET # BLD AUTO: 109 10E9/L (ref 150–450)
PLATELET # BLD EST: ABNORMAL 10*3/UL
POTASSIUM SERPL-SCNC: 3.8 MMOL/L (ref 3.4–5.3)
PROT SERPL-MCNC: 6.2 G/DL (ref 6.8–8.8)
RBC # BLD AUTO: 3.79 10E12/L (ref 4.4–5.9)
SODIUM SERPL-SCNC: 137 MMOL/L (ref 133–144)
SPECIMEN SOURCE: NORMAL
WBC # BLD AUTO: 10.2 10E9/L (ref 4–11)

## 2018-07-07 PROCEDURE — 87641 MR-STAPH DNA AMP PROBE: CPT | Performed by: FAMILY MEDICINE

## 2018-07-07 PROCEDURE — 25800025 ZZH RX 258: Performed by: STUDENT IN AN ORGANIZED HEALTH CARE EDUCATION/TRAINING PROGRAM

## 2018-07-07 PROCEDURE — 12000001 ZZH R&B MED SURG/OB UMMC

## 2018-07-07 PROCEDURE — 85025 COMPLETE CBC W/AUTO DIFF WBC: CPT | Performed by: FAMILY MEDICINE

## 2018-07-07 PROCEDURE — 94640 AIRWAY INHALATION TREATMENT: CPT

## 2018-07-07 PROCEDURE — 83735 ASSAY OF MAGNESIUM: CPT | Performed by: FAMILY MEDICINE

## 2018-07-07 PROCEDURE — 87640 STAPH A DNA AMP PROBE: CPT | Performed by: FAMILY MEDICINE

## 2018-07-07 PROCEDURE — 40000275 ZZH STATISTIC RCP TIME EA 10 MIN: Performed by: OPTOMETRIST

## 2018-07-07 PROCEDURE — 25000132 ZZH RX MED GY IP 250 OP 250 PS 637: Performed by: STUDENT IN AN ORGANIZED HEALTH CARE EDUCATION/TRAINING PROGRAM

## 2018-07-07 PROCEDURE — 40000275 ZZH STATISTIC RCP TIME EA 10 MIN

## 2018-07-07 PROCEDURE — 71046 X-RAY EXAM CHEST 2 VIEWS: CPT

## 2018-07-07 PROCEDURE — 94640 AIRWAY INHALATION TREATMENT: CPT | Mod: 76 | Performed by: OPTOMETRIST

## 2018-07-07 PROCEDURE — 25000125 ZZHC RX 250: Performed by: STUDENT IN AN ORGANIZED HEALTH CARE EDUCATION/TRAINING PROGRAM

## 2018-07-07 PROCEDURE — 80053 COMPREHEN METABOLIC PANEL: CPT | Performed by: FAMILY MEDICINE

## 2018-07-07 PROCEDURE — 84100 ASSAY OF PHOSPHORUS: CPT | Performed by: FAMILY MEDICINE

## 2018-07-07 PROCEDURE — 36415 COLL VENOUS BLD VENIPUNCTURE: CPT | Performed by: FAMILY MEDICINE

## 2018-07-07 PROCEDURE — 25000128 H RX IP 250 OP 636: Performed by: FAMILY MEDICINE

## 2018-07-07 RX ORDER — BENZONATATE 100 MG/1
100 CAPSULE ORAL 3 TIMES DAILY PRN
Status: DISCONTINUED | OUTPATIENT
Start: 2018-07-07 | End: 2018-07-09 | Stop reason: HOSPADM

## 2018-07-07 RX ADMIN — IPRATROPIUM BROMIDE AND ALBUTEROL SULFATE 3 ML: .5; 3 SOLUTION RESPIRATORY (INHALATION) at 02:52

## 2018-07-07 RX ADMIN — BRIMONIDINE TARTRATE 1 DROP: 1 SOLUTION/ DROPS OPHTHALMIC at 07:50

## 2018-07-07 RX ADMIN — POTASSIUM CHLORIDE, DEXTROSE MONOHYDRATE AND SODIUM CHLORIDE: 150; 5; 450 INJECTION, SOLUTION INTRAVENOUS at 21:55

## 2018-07-07 RX ADMIN — PIPERACILLIN SODIUM AND TAZOBACTAM SODIUM 3.38 G: 3; .375 INJECTION, POWDER, LYOPHILIZED, FOR SOLUTION INTRAVENOUS at 21:54

## 2018-07-07 RX ADMIN — IPRATROPIUM BROMIDE AND ALBUTEROL SULFATE 3 ML: .5; 3 SOLUTION RESPIRATORY (INHALATION) at 21:13

## 2018-07-07 RX ADMIN — PIPERACILLIN SODIUM AND TAZOBACTAM SODIUM 3.38 G: 3; .375 INJECTION, POWDER, LYOPHILIZED, FOR SOLUTION INTRAVENOUS at 09:11

## 2018-07-07 RX ADMIN — Medication 1 MG: at 21:57

## 2018-07-07 RX ADMIN — PIPERACILLIN SODIUM AND TAZOBACTAM SODIUM 3.38 G: 3; .375 INJECTION, POWDER, LYOPHILIZED, FOR SOLUTION INTRAVENOUS at 04:27

## 2018-07-07 RX ADMIN — PIPERACILLIN SODIUM AND TAZOBACTAM SODIUM 3.38 G: 3; .375 INJECTION, POWDER, LYOPHILIZED, FOR SOLUTION INTRAVENOUS at 15:40

## 2018-07-07 RX ADMIN — BIMATOPROST 1 DROP: 0.1 SOLUTION/ DROPS OPHTHALMIC at 07:50

## 2018-07-07 RX ADMIN — BENZONATATE 100 MG: 100 CAPSULE, LIQUID FILLED ORAL at 05:39

## 2018-07-07 RX ADMIN — POTASSIUM CHLORIDE, DEXTROSE MONOHYDRATE AND SODIUM CHLORIDE: 150; 5; 450 INJECTION, SOLUTION INTRAVENOUS at 12:43

## 2018-07-07 RX ADMIN — BENZONATATE 100 MG: 100 CAPSULE, LIQUID FILLED ORAL at 21:57

## 2018-07-07 RX ADMIN — POTASSIUM CHLORIDE, DEXTROSE MONOHYDRATE AND SODIUM CHLORIDE: 150; 5; 450 INJECTION, SOLUTION INTRAVENOUS at 02:45

## 2018-07-07 ASSESSMENT — PAIN DESCRIPTION - DESCRIPTORS: DESCRIPTORS: SORE

## 2018-07-07 NOTE — PLAN OF CARE
Problem: Patient Care Overview  Goal: Plan of Care/Patient Progress Review  Outcome: Improving  VSS. On 2L oxygen to maintain sats >90%. Chest xray completed. Patient reports cough improved since last night. Pulmonary toileting encouraged. Up with SBA. Voiding in urinal. Reports small formed BM today, passing gas. Tolerating Low Fat diet without increase pain or nausea. Declines any pain medications.   Plan for GI procedure on Monday.   Continue with POC.

## 2018-07-07 NOTE — PROGRESS NOTES
[facility], [facility] Progress Note - [place] Service       Main Plans for Today   MRSA nasal swab  Continue zosyn   Monitor pain   Repeat labs in am   Plan for OR w/ GI on Monday     Assessment & Plan   Abrahan Hagen is a 72 year old male admitted on 7/3/2018. 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     # Abdominal pain   # Acute cholecystitis   Patient has right upper quadrant abdominal pain with findings of acute cholecystitis on CT scan and HIDA.  His labs are within normal limits without a white count or elevation of his LFTs.  He was transferred here due to his complex surgical history. Patient failed conservative management with abx and advancement of diet with worsened pain and fever. Surgery has discussed with GI and plans for endoscopic drainage of GB through stomach 7/9/2018.   -Continue IV Zosyn  -Continue IV Dilaudid for pain  - monitor CBC, inflammatory markers  -GI consulted   -Appreciate recommendations from surgical team    # Cough  Patient febrile overnight with increasing cough productive of thick sputum.  CXR and LLL crackles concerning for developing LLL pneumonia.  - MRSA nasal PCR  - Continue zosyn, consider adding vancomycin if MRSA nasal swab positive  - Consider sputum culture and blood cultures if febrile    # Murmur   Patient reports history of a valvular abnormality in the past for which he saw a cardiologist.  He had a murmur on presentation here.  No other cardiac history.  Echocardiogram showed normal ejection fraction with mild aortic stenosis.    # Stable chronic medical conditions  - Glaucoma: continue home eye drops  - Chronic interstitial lung disease: continue home Combivent inhaler PRN  - Groin itching: continue home Lotrisone cream PRN     # Pain Assessment:  Current Pain Score 7/7/2018   Patient currently in pain? denies   Pain score  (0-10) -   Pain location -   Pain descriptors -   - Abrahan is experiencing pain due to acute cholecystitis. Pain management was discussed and the plan was created in a collaborative fashion.  Abrahan's response to the current recommendations: engaged  - Please see the plan for pain management as documented above    Diet: Low Fat Diet  Fluids: PO  DVT Prophylaxis: Pneumatic Compression Devices  Code Status: Full Code    Disposition Plan   Expected discharge: 2 - 3 days, recommended to prior living arrangement once adequate pain management/ tolerating PO medications and acute cholecystitis managed. Dispo: Expected Discharge Date: 07/09/18        Entered: Benjy Woodward 07/07/2018, 4:59 PM   Information in the above section will display in the discharge planner report.      The patient's care was discussed with the Attending Physician, Dr. Judd.    Benjy Woodward  [facility]  Pager: [phone]  Please see sticky note for cross cover information    Interval History   Last night had fever and worsening cough productive of thick sputum.  Abdominal pain slightly improved.  + BM, + flatus.  Denies chest pain, shortness of breath.  Still needing 1-2 L of O2 via nasal cannula    Physical Exam   Vital Signs: Temp: 97.6  F (36.4  C) Temp src: Oral BP: 126/52 Pulse: 72   Resp: 16 SpO2: 96 % O2 Device: Nasal cannula Oxygen Delivery: 2 LPM  Weight: 169 lbs 12.8 oz  General Appearance: Awake, alert, NAD, comfortable   Respiratory: clear to auscultation, crackles left base  Cardiovascular: regular rate and rhythm, murmur present, no amadeo, no rub   GI: abd w/ numerous surgical scars, soft, non-distended, RUQ and RLQ TTP w/ guarding, no rebound  Skin: warm, dry        Data    Lab Results   Component Value Date    WBC 10.2 07/07/2018     Lab Results   Component Value Date    RBC 3.79 07/07/2018     Lab Results   Component Value Date    HGB 11.0 07/07/2018     Lab Results   Component Value  Date    HCT 34.2 07/07/2018     No components found for: MCT  Lab Results   Component Value Date    MCV 90 07/07/2018     Lab Results   Component Value Date    MCH 29.0 07/07/2018     Lab Results   Component Value Date    MCHC 32.2 07/07/2018     Lab Results   Component Value Date    RDW 14.2 07/07/2018     Lab Results   Component Value Date     07/07/2018     Last Basic Metabolic Panel:  Lab Results   Component Value Date     07/07/2018      Lab Results   Component Value Date    POTASSIUM 3.8 07/07/2018     Lab Results   Component Value Date    CHLORIDE 103 07/07/2018     Lab Results   Component Value Date    JEFFRY 8.3 07/07/2018     Lab Results   Component Value Date    CO2 28 07/07/2018     Lab Results   Component Value Date    BUN 8 07/07/2018     Lab Results   Component Value Date    CR 0.76 07/07/2018     Lab Results   Component Value Date     07/07/2018     CRP Inflammation   Date Value Ref Range Status   07/05/2018 210.0 (H) 0.0 - 8.0 mg/L Final

## 2018-07-07 NOTE — PLAN OF CARE
Problem: Patient Care Overview  Goal: Plan of Care/Patient Progress Review  Outcome: Therapy, progress toward functional goals is gradual  VSS on 2-3L. Pt c/o of increasing cough; MD notified possible x-ray in AM. Up with Ax1/SBA. Diet advanced to clears tonight and to advance as tolerated to low fat. PIV infusing MIVFs and abx. Melatonin give for sleep. Continue with POC.

## 2018-07-08 LAB
ALBUMIN SERPL-MCNC: 2 G/DL (ref 3.4–5)
ALP SERPL-CCNC: 133 U/L (ref 40–150)
ALT SERPL W P-5'-P-CCNC: 78 U/L (ref 0–70)
ANION GAP SERPL CALCULATED.3IONS-SCNC: 6 MMOL/L (ref 3–14)
AST SERPL W P-5'-P-CCNC: 59 U/L (ref 0–45)
BASOPHILS # BLD AUTO: 0 10E9/L (ref 0–0.2)
BASOPHILS NFR BLD AUTO: 0.2 %
BILIRUB SERPL-MCNC: 0.5 MG/DL (ref 0.2–1.3)
BUN SERPL-MCNC: 6 MG/DL (ref 7–30)
CALCIUM SERPL-MCNC: 8.7 MG/DL (ref 8.5–10.1)
CHLORIDE SERPL-SCNC: 104 MMOL/L (ref 94–109)
CO2 SERPL-SCNC: 28 MMOL/L (ref 20–32)
CREAT SERPL-MCNC: 0.64 MG/DL (ref 0.66–1.25)
DIFFERENTIAL METHOD BLD: ABNORMAL
EOSINOPHIL # BLD AUTO: 0.1 10E9/L (ref 0–0.7)
EOSINOPHIL NFR BLD AUTO: 1.3 %
ERYTHROCYTE [DISTWIDTH] IN BLOOD BY AUTOMATED COUNT: 14.4 % (ref 10–15)
GFR SERPL CREATININE-BSD FRML MDRD: >90 ML/MIN/1.7M2
GLUCOSE BLDC GLUCOMTR-MCNC: 116 MG/DL (ref 70–99)
GLUCOSE SERPL-MCNC: 127 MG/DL (ref 70–99)
HCT VFR BLD AUTO: 34.3 % (ref 40–53)
HGB BLD-MCNC: 11 G/DL (ref 13.3–17.7)
IMM GRANULOCYTES # BLD: 0 10E9/L (ref 0–0.4)
IMM GRANULOCYTES NFR BLD: 0.3 %
LYMPHOCYTES # BLD AUTO: 1.1 10E9/L (ref 0.8–5.3)
LYMPHOCYTES NFR BLD AUTO: 11.8 %
MAGNESIUM SERPL-MCNC: 2.2 MG/DL (ref 1.6–2.3)
MCH RBC QN AUTO: 28.7 PG (ref 26.5–33)
MCHC RBC AUTO-ENTMCNC: 32.1 G/DL (ref 31.5–36.5)
MCV RBC AUTO: 90 FL (ref 78–100)
MONOCYTES # BLD AUTO: 1.2 10E9/L (ref 0–1.3)
MONOCYTES NFR BLD AUTO: 12.7 %
NEUTROPHILS # BLD AUTO: 6.7 10E9/L (ref 1.6–8.3)
NEUTROPHILS NFR BLD AUTO: 73.7 %
NRBC # BLD AUTO: 0 10*3/UL
NRBC BLD AUTO-RTO: 0 /100
PHOSPHATE SERPL-MCNC: 2.2 MG/DL (ref 2.5–4.5)
PLATELET # BLD AUTO: 110 10E9/L (ref 150–450)
PLATELET # BLD EST: ABNORMAL 10*3/UL
POTASSIUM SERPL-SCNC: 4.2 MMOL/L (ref 3.4–5.3)
PROT SERPL-MCNC: 6 G/DL (ref 6.8–8.8)
RBC # BLD AUTO: 3.83 10E12/L (ref 4.4–5.9)
SODIUM SERPL-SCNC: 138 MMOL/L (ref 133–144)
WBC # BLD AUTO: 9.1 10E9/L (ref 4–11)

## 2018-07-08 PROCEDURE — 36415 COLL VENOUS BLD VENIPUNCTURE: CPT | Performed by: FAMILY MEDICINE

## 2018-07-08 PROCEDURE — 83735 ASSAY OF MAGNESIUM: CPT | Performed by: FAMILY MEDICINE

## 2018-07-08 PROCEDURE — 00000146 ZZHCL STATISTIC GLUCOSE BY METER IP

## 2018-07-08 PROCEDURE — 40000141 ZZH STATISTIC PERIPHERAL IV START W/O US GUIDANCE

## 2018-07-08 PROCEDURE — 84100 ASSAY OF PHOSPHORUS: CPT | Performed by: FAMILY MEDICINE

## 2018-07-08 PROCEDURE — 25000128 H RX IP 250 OP 636: Performed by: FAMILY MEDICINE

## 2018-07-08 PROCEDURE — 25800025 ZZH RX 258: Performed by: STUDENT IN AN ORGANIZED HEALTH CARE EDUCATION/TRAINING PROGRAM

## 2018-07-08 PROCEDURE — 12000001 ZZH R&B MED SURG/OB UMMC

## 2018-07-08 PROCEDURE — 80053 COMPREHEN METABOLIC PANEL: CPT | Performed by: FAMILY MEDICINE

## 2018-07-08 PROCEDURE — 85025 COMPLETE CBC W/AUTO DIFF WBC: CPT | Performed by: FAMILY MEDICINE

## 2018-07-08 PROCEDURE — 25000125 ZZHC RX 250: Performed by: FAMILY MEDICINE

## 2018-07-08 RX ORDER — LIDOCAINE 40 MG/G
CREAM TOPICAL
Status: CANCELLED | OUTPATIENT
Start: 2018-07-08

## 2018-07-08 RX ADMIN — POTASSIUM CHLORIDE, DEXTROSE MONOHYDRATE AND SODIUM CHLORIDE: 150; 5; 450 INJECTION, SOLUTION INTRAVENOUS at 22:28

## 2018-07-08 RX ADMIN — PIPERACILLIN SODIUM AND TAZOBACTAM SODIUM 3.38 G: 3; .375 INJECTION, POWDER, LYOPHILIZED, FOR SOLUTION INTRAVENOUS at 16:44

## 2018-07-08 RX ADMIN — POTASSIUM CHLORIDE, DEXTROSE MONOHYDRATE AND SODIUM CHLORIDE: 150; 5; 450 INJECTION, SOLUTION INTRAVENOUS at 07:01

## 2018-07-08 RX ADMIN — PIPERACILLIN SODIUM AND TAZOBACTAM SODIUM 3.38 G: 3; .375 INJECTION, POWDER, LYOPHILIZED, FOR SOLUTION INTRAVENOUS at 05:10

## 2018-07-08 RX ADMIN — POTASSIUM PHOSPHATE, MONOBASIC AND POTASSIUM PHOSPHATE, DIBASIC 15 MMOL: 224; 236 INJECTION, SOLUTION INTRAVENOUS at 14:03

## 2018-07-08 RX ADMIN — PIPERACILLIN SODIUM AND TAZOBACTAM SODIUM 3.38 G: 3; .375 INJECTION, POWDER, LYOPHILIZED, FOR SOLUTION INTRAVENOUS at 21:04

## 2018-07-08 RX ADMIN — BIMATOPROST 1 DROP: 0.1 SOLUTION/ DROPS OPHTHALMIC at 07:57

## 2018-07-08 RX ADMIN — BRIMONIDINE TARTRATE 1 DROP: 1 SOLUTION/ DROPS OPHTHALMIC at 07:57

## 2018-07-08 RX ADMIN — PIPERACILLIN SODIUM AND TAZOBACTAM SODIUM 3.38 G: 3; .375 INJECTION, POWDER, LYOPHILIZED, FOR SOLUTION INTRAVENOUS at 10:28

## 2018-07-08 ASSESSMENT — PAIN DESCRIPTION - DESCRIPTORS: DESCRIPTORS: DISCOMFORT

## 2018-07-08 NOTE — PLAN OF CARE
"Problem: Patient Care Overview  Goal: Plan of Care/Patient Progress Review  Outcome: No Change  HD 5. 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 NPO at midnight. Needs to shower tonight and tomorrow morning before surgery. Patient has been educated on this. Currently on a low fat diet but has little appetite. Voiding adequately into urinal. 1 incontinent urine episode. Hold stool softeners this evening. Had 3 BMs today. Up ind. PIV x 1. d5 .45 NS 20KCL @ 125 mL/hr. 2-3 L NC. Baseline at home is none. Denies pain, nausea, SOB, chest pain and numbness and tingling. Continue POC.    Pt would LIKE to receive bedside shift report.      "

## 2018-07-08 NOTE — PROGRESS NOTES
Social Work Services Progress Note    Hospital Day: 6  Collaborated with:  Abrahan, spouse April    Data:  Referred by night nurse to see Pt re financial concerns he expressed during the night. Full SW assessment not needed.    Intervention:  Reviewed with Pt and spouse their financial situation. They have a lot of debt. Not accumulating new debt. Pt wondering about how he can get short term disability insurance. It's offered thru work but he missed the sign up. He has 3 more weeks of paid time off and hopes he can return to work by then. He receives SS half-way but also works full time. His wife works part time.   Discussed that he will need to find out when their open enrollment is at work and see if he is eligible for disability insurance. He may be excluded due to his age or cost could be very high.     Assessment:  Discussed reviewing their situation with [facility]  which does financial counseling as there may be options available to them to assist with managing their debt. Their initial consultation is free and they would inform them of any costs for ongoing consultation or debt management services. Pt is anxious about their situation. His wife down plays his worries.     Plan:  Provided LSS contact info to Pt.       Follow Up:  No further f/u planned at this time.     Yesenia Rivers, [facility]  Weekend   [phone]

## 2018-07-08 NOTE — PLAN OF CARE
Problem: Patient Care Overview  Goal: Plan of Care/Patient Progress Review  Outcome: Improving  Pt requiring 2-3L O2 to maintain sats. Reports minor abdominal discomfort but declines interventions. Tolerating low fat diet w/o c/o of nausea. Reports passing flatus and having a BM this AM. Voiding with good output. Up with SBA, ambulating in halls. Phos currently being replaced, recheck tomorrow AM. SW consult requested per pt's expressed needs. NPO at midnight for procedure tomorrow with general surgery. Continue with POC.

## 2018-07-08 NOTE — PLAN OF CARE
Problem: Patient Care Overview  Goal: Plan of Care/Patient Progress Review  Outcome: Therapy, progress toward functional goals is gradual  VSS on 2-3L. Denies pain. Up with Ax1/SBA ambulated in hallway x1. Voiding adequate amounts, no BM. Tolerating low fat diet; will be NPO tonight before procedure on Monday morning (7-9). PIV infusing MIVFs and abx. Melatonin give for sleep. Continue with POC.

## 2018-07-08 NOTE — PROGRESS NOTES
[facility], [facility] Progress Note - [place]       Main Plans for Today   Continue zosyn   Monitor pain   Repeat labs in am   NPO at midnight   Plan for OR w/ GI on Monday, but will discuss possibility of conservative management     Assessment & Plan   Abrahan Hagen is a 72 year old male admitted on 7/3/2018. 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     # Abdominal pain   # Acute cholecystitis   # Elevated AST/ALT   Patient has right upper quadrant abdominal pain with findings of acute cholecystitis on CT scan and HIDA.  His labs are significant for mildly elevated AST and ALT today likely from reaction to GB inflammation. Bilirubin and WBCs are wnl.  He was transferred here due to his complex surgical history. Patient failed conservative management with abx and advancement of diet with worsened pain and fever. Surgery has discussed with GI and plans for endoscopic drainage of GB through stomach 7/9/2018. Another option proposed by the surgical team is conservative mgmt with abx and discharge w/ plan to return for procedure if worse, but the patient is in favor of having the procedure during this hospital stay.   -Continue IV Zosyn  -Continue IV Dilaudid for pain  - monitor CBC, inflammatory markers, hepatic panel  -GI consulted   -Appreciate recommendations from surgical team    # Cough  Patient febrile two nights ago with increasing cough productive of thick sputum.  CXR and LLL crackles concerning for developing LLL pneumonia.  - MRSA nasal PCR negative   - Continue zosyn  - Consider sputum culture and blood cultures if febrile    # Murmur   Patient reports history of a valvular abnormality in the past for which he saw a cardiologist.  He had a murmur on presentation here.  No other cardiac history.  Echocardiogram showed normal ejection fraction with mild aortic  "stenosis.    # Stable chronic medical conditions  - Glaucoma: continue home eye drops  - Chronic interstitial lung disease: continue home Combivent inhaler PRN  - Groin itching: continue home Lotrisone cream PRN     # Pain Assessment:  Current Pain Score 7/8/2018   Patient currently in pain? yes   Pain score (0-10) -   Pain location Abdomen   Pain descriptors Discomfort   - Abrahan is experiencing pain due to acute cholecystitis. Pain management was discussed and the plan was created in a collaborative fashion.  Abrahan's response to the current recommendations: engaged  - Please see the plan for pain management as documented above    Diet: Low Fat Diet, NPO at midnight   Fluids: PO  DVT Prophylaxis: Pneumatic Compression Devices  Code Status: Full Code    Disposition Plan   Expected discharge: 2 - 3 days, recommended to prior living arrangement once adequate pain management/ tolerating PO medications and acute cholecystitis managed. Dispo: Expected Discharge Date: 07/10/18        Entered: David Saunders 07/08/2018, 8:12 AM   Information in the above section will display in the discharge planner report.      The patient's care was discussed with the Attending Physician, Dr. Judd.    David Saunders  [facility]  Pager: [phone]  Please see sticky note for cross cover information    Interval History   No acute overnight events. States pain is 3/10, but at times denies it is in fact \"pain\". He denies any fevers, chills, nausea, vomiting, chest pain, or shortness of breath overnight. He has remained comfortable on 2 LPM by NC. Cough improving.     Physical Exam   Vital Signs: Temp: 97.3  F (36.3  C) Temp src: Axillary BP: 130/69 Pulse: 76   Resp: 18 SpO2: 94 % O2 Device: Nasal cannula Oxygen Delivery: 2 LPM  Weight: 169 lbs 11.2 oz  General Appearance: Awake, alert, NAD, comfortable   Respiratory: clear to auscultation, crackles left base  Cardiovascular: regular rate and rhythm, " murmur present, no amadeo, no rub   GI: abd w/ numerous surgical scars, soft, non-distended, RUQ TTP w/ guarding, no rebound  Skin: warm, dry        Data

## 2018-07-08 NOTE — PROGRESS NOTES
"Subjective: No acute issues overnight. Pt reports feeling well. Pain well controlled. Denies fevers, chills, and N/V. Tolerating a low fat diet. Adequate UOP. \"not passing too much gas yet\", but endorsed 2 BM yesterday. Discussed improved symptoms and possibility of avoiding GI procedure on Monday with watchful waiting in the future. Patient preferred to go ahead with procedure so that he does not have recurrent symptoms in the future.    Objective:   /69 (BP Location: Left arm)  Pulse 76  Temp 97.3  F (36.3  C) (Axillary)  Resp 18  Ht 1.727 m (5' 8\")  Wt 77 kg (169 lb 11.2 oz)  SpO2 94%  BMI 25.8 kg/m2    PE:  Gen: Awake, alert, NAD   Resp: non-labored at rest  Abd: soft, non-distended, minimal TTP in RUQ  Ext: warm and well perfused    I/O last 3 completed shifts:  In: 3630 [P.O.:630; I.V.:3000]  Out: 2400 [Urine:2400] - Last 24 hours      Labs/Imaging  Heme:  Recent Labs  Lab 07/08/18  0729 07/07/18  1114 07/06/18  0756 07/05/18  2124   WBC 9.1 10.2 10.1 9.2   HGB 11.0* 11.0* 11.6* 12.1*   * 109* 119* 116*     Chem:  Recent Labs  Lab 07/07/18  1114 07/05/18  2124 07/04/18  0710   POTASSIUM 3.8 3.9 3.7   CR 0.76 0.71 0.67         A/P: Abrahan Hagen is a 72 year old male, Abrahan Hagen is a 72 year old male w/ h/o esophagus repair with colonic conduit, GERD, chronic interstitial lung disease, and glaucoma. Plan to discuss the case with staff, Primary team and GI as symptoms are resolving and patient is tolerating a low fat diet, it is possible the patient does not need planned transgastric gallbladder drainage by GI. Will discuss with teams, General Surgery to likely sign off afterwards as no general surgical interventions warranted at this time.     NEURO Pain well controlled on Dilaudid PRN.  Changes:  None    CV HDS.    PULM Aggressive pulmonary toilet and I/S.   FEN/GI mIVF @ 125 ml/hr.   Continue current low fat diet.   Planned for GI to place transgastric gallbladder drain on 7/8, however " patient is improving clinically. Discussed procedure, indications, and current clinical picture with patient and he would like to go ahead in order to avoid future episodes. Will discuss indications with Primary and GI teams.     No acute issues, good UOP    HEME Hgb as above. Postop anemia expected for this surgery.  Continue to monitor. No transfusions indicated at this time   ID Afebrile, no leukocytosis.    Antibiotics: zosyn day 5        ENDO No issues.   ACTIVITY Up as tolerated  PT/OT consulted  Ambulate at least TID    PPx SCDs, ambulation   DISPO Continued cares per Primary team, general surgery to sign off at this time. Please call with questions.       Pt discussed with staff, Dr. Bernard.  Yasmin De Souza - General Surgery Chief Resident PGY6 - Pager: [phone]

## 2018-07-09 VITALS
HEIGHT: 68 IN | RESPIRATION RATE: 16 BRPM | OXYGEN SATURATION: 96 % | DIASTOLIC BLOOD PRESSURE: 65 MMHG | HEART RATE: 71 BPM | BODY MASS INDEX: 25.76 KG/M2 | WEIGHT: 170 LBS | TEMPERATURE: 97.3 F | SYSTOLIC BLOOD PRESSURE: 116 MMHG

## 2018-07-09 LAB
ALBUMIN SERPL-MCNC: 2.1 G/DL (ref 3.4–5)
ALP SERPL-CCNC: 128 U/L (ref 40–150)
ALT SERPL W P-5'-P-CCNC: 74 U/L (ref 0–70)
ANION GAP SERPL CALCULATED.3IONS-SCNC: 6 MMOL/L (ref 3–14)
AST SERPL W P-5'-P-CCNC: 44 U/L (ref 0–45)
BILIRUB SERPL-MCNC: 0.6 MG/DL (ref 0.2–1.3)
BUN SERPL-MCNC: 6 MG/DL (ref 7–30)
CALCIUM SERPL-MCNC: 9 MG/DL (ref 8.5–10.1)
CHLORIDE SERPL-SCNC: 101 MMOL/L (ref 94–109)
CO2 SERPL-SCNC: 30 MMOL/L (ref 20–32)
CREAT SERPL-MCNC: 0.67 MG/DL (ref 0.66–1.25)
ERYTHROCYTE [DISTWIDTH] IN BLOOD BY AUTOMATED COUNT: 14.4 % (ref 10–15)
GFR SERPL CREATININE-BSD FRML MDRD: >90 ML/MIN/1.7M2
GLUCOSE SERPL-MCNC: 111 MG/DL (ref 70–99)
HCT VFR BLD AUTO: 35.3 % (ref 40–53)
HGB BLD-MCNC: 11.8 G/DL (ref 13.3–17.7)
INR PPP: 1.22 (ref 0.86–1.14)
MCH RBC QN AUTO: 29.4 PG (ref 26.5–33)
MCHC RBC AUTO-ENTMCNC: 33.4 G/DL (ref 31.5–36.5)
MCV RBC AUTO: 88 FL (ref 78–100)
PHOSPHATE SERPL-MCNC: 3.6 MG/DL (ref 2.5–4.5)
PLATELET # BLD AUTO: 122 10E9/L (ref 150–450)
POTASSIUM SERPL-SCNC: 4.2 MMOL/L (ref 3.4–5.3)
PROT SERPL-MCNC: 6.3 G/DL (ref 6.8–8.8)
RBC # BLD AUTO: 4.02 10E12/L (ref 4.4–5.9)
SODIUM SERPL-SCNC: 138 MMOL/L (ref 133–144)
WBC # BLD AUTO: 7.5 10E9/L (ref 4–11)

## 2018-07-09 PROCEDURE — 85610 PROTHROMBIN TIME: CPT | Performed by: FAMILY MEDICINE

## 2018-07-09 PROCEDURE — 36415 COLL VENOUS BLD VENIPUNCTURE: CPT | Performed by: FAMILY MEDICINE

## 2018-07-09 PROCEDURE — 84100 ASSAY OF PHOSPHORUS: CPT | Performed by: FAMILY MEDICINE

## 2018-07-09 PROCEDURE — 85027 COMPLETE CBC AUTOMATED: CPT | Performed by: FAMILY MEDICINE

## 2018-07-09 PROCEDURE — 25800025 ZZH RX 258: Performed by: STUDENT IN AN ORGANIZED HEALTH CARE EDUCATION/TRAINING PROGRAM

## 2018-07-09 PROCEDURE — 25000128 H RX IP 250 OP 636: Performed by: FAMILY MEDICINE

## 2018-07-09 PROCEDURE — 80053 COMPREHEN METABOLIC PANEL: CPT | Performed by: FAMILY MEDICINE

## 2018-07-09 RX ORDER — AMOXICILLIN AND CLAVULANATE POTASSIUM 500; 125 MG/1; MG/1
1 TABLET, FILM COATED ORAL 2 TIMES DAILY
Qty: 6 TABLET | Refills: 0 | Status: SHIPPED | OUTPATIENT
Start: 2018-07-09 | End: 2018-07-12

## 2018-07-09 RX ORDER — OXYCODONE AND ACETAMINOPHEN 5; 325 MG/1; MG/1
1 TABLET ORAL EVERY 6 HOURS PRN
Qty: 5 TABLET | Refills: 0 | Status: ON HOLD | OUTPATIENT
Start: 2018-07-09 | End: 2018-08-07

## 2018-07-09 RX ADMIN — BRIMONIDINE TARTRATE 1 DROP: 1 SOLUTION/ DROPS OPHTHALMIC at 09:06

## 2018-07-09 RX ADMIN — PIPERACILLIN SODIUM AND TAZOBACTAM SODIUM 3.38 G: 3; .375 INJECTION, POWDER, LYOPHILIZED, FOR SOLUTION INTRAVENOUS at 04:52

## 2018-07-09 RX ADMIN — BIMATOPROST 1 DROP: 0.1 SOLUTION/ DROPS OPHTHALMIC at 09:06

## 2018-07-09 RX ADMIN — PIPERACILLIN SODIUM AND TAZOBACTAM SODIUM 3.38 G: 3; .375 INJECTION, POWDER, LYOPHILIZED, FOR SOLUTION INTRAVENOUS at 10:50

## 2018-07-09 RX ADMIN — POTASSIUM CHLORIDE, DEXTROSE MONOHYDRATE AND SODIUM CHLORIDE: 150; 5; 450 INJECTION, SOLUTION INTRAVENOUS at 06:49

## 2018-07-09 ASSESSMENT — ENCOUNTER SYMPTOMS
WHEEZING: 0
NAUSEA: 0
NEUROLOGICAL NEGATIVE: 1
SHORTNESS OF BREATH: 0
CONSTIPATION: 0
PALPITATIONS: 0
FEVER: 0
MYALGIAS: 0
FATIGUE: 0
ARTHRALGIAS: 0
EYE PAIN: 0
VOMITING: 0
COUGH: 0
WEAKNESS: 0
ABDOMINAL PAIN: 0
DIARRHEA: 0
NUMBNESS: 0
POLYDIPSIA: 0

## 2018-07-09 NOTE — PLAN OF CARE
Problem: Patient Care Overview  Goal: Plan of Care/Patient Progress Review  Outcome: Adequate for Discharge Date Met: 07/09/18  VSS on room air and afebrile. Denies SOB, chest pain, abdominal pain, nausea/vomiting and is tolerating low fat diet. Reports passing flatus, last BM 7/8. Voiding with good output. Up ad monika. Plan to d/c to home this afternoon with spouse with PO antibiotics with outpatient follow-up. Continue with POC.

## 2018-07-09 NOTE — PROGRESS NOTES
"GASTROENTEROLOGY PROGRESS NOTE    ASSESSMENT:  72 year old male with a history of chronic interstitial lung disease, congenital atresia of the esophagus status post colonic conduit repair, inguinal hernia repair, who was transferred from [facility] on 7/3/2018 for management of acute cholecystitis.  Patient is clinically stable on antibiotics however not tolerating oral intake at time of consultation.  Concur with diagnosis of acute cholecystitis based on clinical findings and HIDA scan. Patient now appears to have improved on medical therapy thus would not pursue intervention.  If patient had recurrence of cholecystitis would recommend transgastric gallbladder drainage.        RECOMMENDATIONS:  -ADAT to low fat diet  -complete 10 day course of antibiotics  -symptomatic management of any pain or nausea  -if patient readmitted for cholecystitis, GI should be consulted  -no follow up required with GI otherwise.   -Gastroenterology will sign off please call for questions    The patient was discussed and plan agreed upon with GI staff.    Swapnil Nguyễn  [facility]  Gastroenterology Fellow  _______________________________________________________________  S: No acute events overnight.  Patient tolerated mash potatoes, intact appetite, minimal to no pain.  Some dry cough with sputum production on room air.    O:  Blood pressure 116/65, pulse 71, temperature 97.3  F (36.3  C), temperature source Oral, resp. rate 16, height 1.727 m (5' 8\"), weight 77.1 kg (170 lb), SpO2 100 %.    Gen:NAD  HEENT: Moist mucous membranes no scleral icterus  CV: Regular rate and rhythm  Lungs: Clear to auscultation bilaterally no wheezes crackles  Abd: Soft, bowel sounds positive, surgical scars appreciated, mild tenderness in right upper quadrant to deep palpation, no rebound  Skin: No jaundice or rash  MS: Warm and well-perfused  Neuro: Grossly intact, irritable        LABS:  BMP  Recent Labs  Lab " 07/09/18  0717 07/08/18  0729 07/07/18  1114 07/05/18  2124    138 137 136   POTASSIUM 4.2 4.2 3.8 3.9   CHLORIDE 101 104 103 104   JEFFRY 9.0 8.7 8.3* 8.5   CO2 30 28 28 25   BUN 6* 6* 8 11   CR 0.67 0.64* 0.76 0.71   * 127* 154* 117*     CBC  Recent Labs  Lab 07/09/18  0758 07/08/18  0729 07/07/18  1114 07/06/18  0756   WBC 7.5 9.1 10.2 10.1   RBC 4.02* 3.83* 3.79* 3.93*   HGB 11.8* 11.0* 11.0* 11.6*   HCT 35.3* 34.3* 34.2* 35.4*   MCV 88 90 90 90   MCH 29.4 28.7 29.0 29.5   MCHC 33.4 32.1 32.2 32.8   RDW 14.4 14.4 14.2 14.0   * 110* 109* 119*     INR  Recent Labs  Lab 07/09/18  0717 07/04/18  0710   INR 1.22* 1.32*     LFTs  Recent Labs  Lab 07/09/18  0717 07/08/18  0729 07/07/18  1114 07/06/18  0756   ALKPHOS 128 133 131 111   AST 44 59* 50* 28   ALT 74* 78* 61 47   BILITOTAL 0.6 0.5 0.5 0.7   PROTTOTAL 6.3* 6.0* 6.2* 6.4*   ALBUMIN 2.1* 2.0* 2.1* 2.2*      PANC  Recent Labs  Lab 07/04/18  0710   LIPASE 36*   AMYLASE 38

## 2018-07-09 NOTE — PLAN OF CARE
Problem: Patient Care Overview  Goal: Plan of Care/Patient Progress Review  VSS on 2-3L. Denies pain. Up with Ax1/SBA ambulated in hallway x1. Voiding adequate amounts, no BM on this shift. NPO at midnight for procedure on Monday morning (7-9). PIV infusing MIVFs and abx. One scrub completed. Continue with POC.     Addendum: Second shower completed.

## 2018-07-09 NOTE — DISCHARGE SUMMARY
[facility], [facility] Medicine Discharge Summary       Date of Admission:  7/3/2018  Date of Discharge:  7/9/2018  Date of Service: 7/9/2018  Discharging Attending Provider: Dr Walker  Discharge Team: [facility] Service    Discharge Diagnoses   Acute cholecystitis    Follow-ups Needed After Discharge   Follow up with PCP within 7 days   Follow up with GI if recurrent pain or fever greater than 100.4       Hospital Course   Abrahan Hagen was admitted on 7/3/2018 for acute cholecystitis.  The following problems were addressed during his hospitalization:    # Abdominal pain   # Acute cholecystitis   # Elevated AST/ALT   Patient had right upper quadrant abdominal pain with findings of acute cholecystitis on CT scan and HIDA.  His labs were significant for mildly elevated AST and ALT likely from reaction to GB inflammation. Bilirubin and WBCs were wnl.  He was transferred here due to his complex surgical history. Patient failed conservative management with abx and advancement of diet with worsened pain and fever. Surgery and interventional radiology declined any procedural intervention. Surgery discussed with GI and planned for endoscopic drainage of GB through stomach 7/9/2018, however the patient improved prior to the procedure so it was not performed. Plan from the GI service was for PO antibiotics to complete a 10 day course w/ return for a same day procedure with GI if any recurrence of symptoms.        # Cough  Patient febrile with increasing cough productive of thick sputum while here.  CXR and LLL crackles concerning for developing LLL pneumonia. Completed 7 days of zosyn inpatient without further fever and continued on augmentin to complete his 10 days for acute cholecystitis.      # Murmur   Patient reports history of a valvular abnormality in the past for which he saw a cardiologist.  He had a murmur on presentation here.  No other cardiac history.   Echocardiogram showed normal ejection fraction with mild aortic stenosis.     # Stable chronic medical conditions  - Glaucoma: continued home eye drops  - Chronic interstitial lung disease: continued home Combivent inhaler PRN  - Groin itching: continued home Lotrisone cream PRN    # Discharge Pain Plan:   - During his hospitalization, Abrahan experienced pain due to acute cholecystitis.  The pain plan for discharge was discussed with Abrahan and the plan was created in a collaborative fashion.    - Opioids prescribed on discharge: percocet 5/325 mg   - Duration of opioids after discharge: Per CDC opioid prescribing guidelines, a 3 day prescription of opioids was provided.  - Bowel regimen: not needed    Consultations This Hospital Stay   SURGERY GENERAL ADULT IP CONSULT  INTERVENTIONAL RADIOLOGY ADULT/PEDS IP CONSULT  GI PANCREATICOBILIARY ADULT IP CONSULT    Code Status   Full Code       The patient was discussed with Dr. Denise [facility]ta[facility] Inpatient Service  [facility]   Pager: [phone]  ______________________________________________________________________  Review of Systems   Constitutional: Negative for fatigue and fever.   HENT: Negative.    Eyes: Negative for pain and visual disturbance.   Respiratory: Negative for cough, shortness of breath and wheezing.    Cardiovascular: Negative for chest pain and palpitations.   Gastrointestinal: Negative for abdominal pain, constipation, diarrhea, nausea and vomiting.   Endocrine: Negative for polydipsia and polyuria.   Musculoskeletal: Negative for arthralgias and myalgias.   Skin: Negative.    Neurological: Negative.  Negative for weakness and numbness.     Physical Exam   Vital Signs: Temp: 97.3  F (36.3  C) Temp src: Oral BP: 116/65 Pulse: 71   Resp: 16 SpO2: 100 % O2 Device: Nasal cannula Oxygen Delivery: 3 LPM  Weight: 170 lbs 0 oz    Physical Exam   Constitutional: He appears well-developed and well-nourished. No  distress.   HENT:   Head: Normocephalic and atraumatic.   Eyes: Right eye exhibits no discharge. Left eye exhibits no discharge. No scleral icterus.   Cardiovascular: Normal rate, regular rhythm and normal heart sounds.  Exam reveals no gallop and no friction rub.    No murmur heard.  Pulmonary/Chest: Effort normal and breath sounds normal. No respiratory distress. He has no wheezes. He has no rales.   Abdominal: Soft. He exhibits no distension. There is tenderness (mild RUQ ). There is no rebound and no guarding.   Neurological: He is alert. No cranial nerve deficit.   Skin: Skin is warm and dry. He is not diaphoretic.         Significant Results and Procedures   Results for orders placed or performed during the hospital encounter of 07/03/18   NM HepatOBiliary Scan    Narrative    Examination:  NM HEPATOBILIARY SCAN      Date: 7/4/2018 7:59 PM.    Indication:   Pt with acute cholecystitis seen on CT, per surgery  consultant recommending HIDA for further evaluation;      Additional Information: none    Technique:    The patient received 5.5 mCi of Tc-99m Choletec intravenously. Images  were obtained out through 60 minutes.   At 60 minutes the patient  received 2 mg of morphine. An additional 30 minutes of images were  obtained.    Findings:    There is prompt clearance of the radionuclide from the blood pool into  the liver. By 10 minutes there is clear visualization of the  intrahepatic ducts as well as the upper common bile duct. The  gallbladder is nonvisualized. By 15 minutes there is emptying from the  common bile duct into the small bowel. Gallbladder did not fill  following administration of morphine.    Enterogastric reflux was present.      Impression    Impression:  The gallbladder does not fill following administration of morphine.  This is consistent with cholecystitis.    I have personally reviewed the examination and initial interpretation  and I agree with the findings.    OFELIA CHRISTENSEN MD    XR Chest 2 Views    Narrative    XR CHEST 2 VW  7/4/2018 11:16 AM      HISTORY: pre op;     COMPARISON: None    FINDINGS: PA and lateral views of the chest upright. Substantial, left  greater than right predominantly interstitial opacities. Cardiac  silhouette and pulmonary vasculature are obscured. Blunting of the  right costophrenic angle presenting either pleural thickening or small  effusion. No definite acute osseous abnormality.       Impression    IMPRESSION: Substantial changes of chronic interstitial lung disease,  greater on the left. Blunting of the right costophrenic angle is  favored to represent pleural thickening/plaque over effusion. No  definite acute findings.    I have personally reviewed the examination and initial interpretation  and I agree with the findings.    OFELIA CHRISTENSEN MD   XR Chest Port 1 View    Narrative    Exam: XR CHEST PORT 1 VW, 7/5/2018 9:46 PM    Indication: dyspnea, hypoxia, fever;     Comparison: Radiograph on 7/4/2018    Findings:   Single portable frontal view of chest.  No significant change in left greater than on right interstitial  opacities. Cardiac silhouette and pulmonary vasculatures are obscured.  Blunting of bilateral costophrenic, it can be scarring versus small  pleural effusion. Chronic deformity of right chest wall. Unchanged  small left Bochdalek hernia. Better seen on CT of 7/2/2018. No  appreciable pneumothorax. No acute osseous abnormality. Stable  postoperative changes of colonic conduit construction.      Impression    Impression:   1. No significant change in extensive chronic interstitial lung  disease.    I have personally reviewed the examination and initial interpretation  and I agree with the findings.    FLORI RIVERA MD   XR Chest 2 Views    Narrative    Exam: XR CHEST 2 VW, 7/7/2018 8:44 AM    Indication: cough, hypoxia;     Comparison: 7/5/2018    Findings:   Slight increase in the diffuse hazy opacity throughout the lung  suggesting  worsening edema. This is superimposed on extensive chronic  interstitial lung disease. Heart within normal limits. Upper abdomen  unremarkable. Pleural thickening is stable.      Impression    Impression: Increased opacity throughout the lung likely edema  superimposed on the patient's chronic lung disease. Infection would  have a similar appearance.    FLORI RIVERA MD       Pending Results   These results will be followed up by PCP  Unresulted Labs Ordered in the Past 30 Days of this Admission     Date and Time Order Name Status Description    7/5/2018 2106 Blood culture Preliminary     7/5/2018 2106 Blood culture Preliminary              Primary Care Physician   Ananda Jones    Discharge Disposition   Discharged to home  Condition at discharge: Stable    Discharge Orders   No discharge procedures on file.  Discharge Medications   Current Discharge Medication List      START taking these medications    Details   oxyCODONE-acetaminophen (PERCOCET) 5-325 MG per tablet Take 1 tablet by mouth every 6 hours as needed for pain  Qty: 5 tablet, Refills: 0    Associated Diagnoses: Acute cholecystitis         CONTINUE these medications which have NOT CHANGED    Details   bimatoprost (LUMIGAN) 0.01 % SOLN Place 1 drop into both eyes daily      clotrimazole-betamethasone (LOTRISONE) cream Apply topically 2 times daily as needed      Ipratropium-Albuterol (COMBIVENT RESPIMAT)  MCG/ACT inhaler Inhale 1 puff into the lungs daily as needed      brimonidine (ALPHAGAN P) 0.1 % ophthalmic solution Place 1 drop into both eyes daily           Allergies   No Known Allergies

## 2018-07-09 NOTE — PLAN OF CARE
Problem: Patient Care Overview  Goal: Plan of Care/Patient Progress Review  Outcome: No Change  Assumed care of pt from 2216-8324. AVSS. A&Ox4. Apical pulse regular. Lungs CTA. Bowel sounds active in all quadrants. NPO at 0000 in preparation from cologastrolstomy in the AM; pt had 1st shower and needs scrub in AM. Voiding spontaneously with adequate output. MIVF running at 125cc/hr. Denies pain. Continue with POC.

## 2018-07-10 ENCOUNTER — CARE COORDINATION (OUTPATIENT)
Dept: CARE COORDINATION | Facility: CLINIC | Age: 73
End: 2018-07-10

## 2018-07-10 NOTE — PLAN OF CARE
Problem: Patient Care Overview  Goal: Discharge Needs Assessment  Outcome: Adequate for Discharge Date Met: 07/09/18  Patient reviewed discharge instructions. Has follow up number for questions or concerns. Patient will  medications in [facility] pharmacy. Discharged to home c Wife @ [phone].

## 2018-07-11 LAB
BACTERIA SPEC CULT: NO GROWTH
BACTERIA SPEC CULT: NO GROWTH
Lab: NORMAL
Lab: NORMAL
SPECIMEN SOURCE: NORMAL
SPECIMEN SOURCE: NORMAL

## 2018-08-07 ENCOUNTER — APPOINTMENT (OUTPATIENT)
Dept: INTERVENTIONAL RADIOLOGY/VASCULAR | Facility: CLINIC | Age: 73
DRG: 441 | End: 2018-08-07
Attending: RADIOLOGY PRACTITIONER ASSISTANT
Payer: COMMERCIAL

## 2018-08-07 ENCOUNTER — HOSPITAL ENCOUNTER (INPATIENT)
Facility: CLINIC | Age: 73
LOS: 2 days | Discharge: HOME | DRG: 441 | End: 2018-08-09
Attending: PEDIATRICS | Admitting: FAMILY MEDICINE
Payer: COMMERCIAL

## 2018-08-07 DIAGNOSIS — K81.0 ACUTE CHOLECYSTITIS: Primary | ICD-10-CM

## 2018-08-07 DIAGNOSIS — L02.91 PHLEGMON: ICD-10-CM

## 2018-08-07 LAB
ANION GAP SERPL CALCULATED.3IONS-SCNC: 8 MMOL/L (ref 3–14)
BUN SERPL-MCNC: 13 MG/DL (ref 7–30)
CALCIUM SERPL-MCNC: 8.4 MG/DL (ref 8.5–10.1)
CHLORIDE SERPL-SCNC: 106 MMOL/L (ref 94–109)
CO2 SERPL-SCNC: 23 MMOL/L (ref 20–32)
CREAT SERPL-MCNC: 0.68 MG/DL (ref 0.66–1.25)
ERYTHROCYTE [DISTWIDTH] IN BLOOD BY AUTOMATED COUNT: 15.1 % (ref 10–15)
GFR SERPL CREATININE-BSD FRML MDRD: >90 ML/MIN/1.7M2
GLUCOSE BLDC GLUCOMTR-MCNC: 129 MG/DL (ref 70–99)
GLUCOSE SERPL-MCNC: 100 MG/DL (ref 70–99)
HCT VFR BLD AUTO: 37.7 % (ref 40–53)
HGB BLD-MCNC: 12.4 G/DL (ref 13.3–17.7)
MCH RBC QN AUTO: 29.1 PG (ref 26.5–33)
MCHC RBC AUTO-ENTMCNC: 32.9 G/DL (ref 31.5–36.5)
MCV RBC AUTO: 89 FL (ref 78–100)
PLATELET # BLD AUTO: 128 10E9/L (ref 150–450)
POTASSIUM SERPL-SCNC: 3.7 MMOL/L (ref 3.4–5.3)
RBC # BLD AUTO: 4.26 10E12/L (ref 4.4–5.9)
SODIUM SERPL-SCNC: 137 MMOL/L (ref 133–144)
WBC # BLD AUTO: 10.6 10E9/L (ref 4–11)

## 2018-08-07 PROCEDURE — 87205 SMEAR GRAM STAIN: CPT | Performed by: FAMILY MEDICINE

## 2018-08-07 PROCEDURE — 87186 SC STD MICRODIL/AGAR DIL: CPT | Performed by: FAMILY MEDICINE

## 2018-08-07 PROCEDURE — 27211039 ZZH NEEDLE CR2

## 2018-08-07 PROCEDURE — 25000125 ZZHC RX 250: Performed by: STUDENT IN AN ORGANIZED HEALTH CARE EDUCATION/TRAINING PROGRAM

## 2018-08-07 PROCEDURE — C1769 GUIDE WIRE: HCPCS

## 2018-08-07 PROCEDURE — 25000128 H RX IP 250 OP 636: Performed by: RADIOLOGY

## 2018-08-07 PROCEDURE — 87077 CULTURE AEROBIC IDENTIFY: CPT | Performed by: FAMILY MEDICINE

## 2018-08-07 PROCEDURE — 25000125 ZZHC RX 250: Performed by: RADIOLOGY

## 2018-08-07 PROCEDURE — 27210907 ZZH KIT CR9

## 2018-08-07 PROCEDURE — 80048 BASIC METABOLIC PNL TOTAL CA: CPT | Performed by: STUDENT IN AN ORGANIZED HEALTH CARE EDUCATION/TRAINING PROGRAM

## 2018-08-07 PROCEDURE — 0F9430Z DRAINAGE OF GALLBLADDER WITH DRAINAGE DEVICE, PERCUTANEOUS APPROACH: ICD-10-PCS | Performed by: RADIOLOGY

## 2018-08-07 PROCEDURE — 47533 PLMT BILIARY DRAINAGE CATH: CPT

## 2018-08-07 PROCEDURE — 87075 CULTR BACTERIA EXCEPT BLOOD: CPT | Performed by: FAMILY MEDICINE

## 2018-08-07 PROCEDURE — 87070 CULTURE OTHR SPECIMN AEROBIC: CPT | Performed by: FAMILY MEDICINE

## 2018-08-07 PROCEDURE — 85027 COMPLETE CBC AUTOMATED: CPT | Performed by: STUDENT IN AN ORGANIZED HEALTH CARE EDUCATION/TRAINING PROGRAM

## 2018-08-07 PROCEDURE — 27210903 ZZH KIT CR5

## 2018-08-07 PROCEDURE — 36415 COLL VENOUS BLD VENIPUNCTURE: CPT | Performed by: STUDENT IN AN ORGANIZED HEALTH CARE EDUCATION/TRAINING PROGRAM

## 2018-08-07 PROCEDURE — 27210912 ZZH NEEDLE CR8

## 2018-08-07 PROCEDURE — 87040 BLOOD CULTURE FOR BACTERIA: CPT | Performed by: STUDENT IN AN ORGANIZED HEALTH CARE EDUCATION/TRAINING PROGRAM

## 2018-08-07 PROCEDURE — 00000146 ZZHCL STATISTIC GLUCOSE BY METER IP

## 2018-08-07 PROCEDURE — 25000132 ZZH RX MED GY IP 250 OP 250 PS 637: Performed by: STUDENT IN AN ORGANIZED HEALTH CARE EDUCATION/TRAINING PROGRAM

## 2018-08-07 PROCEDURE — 27210732 ZZH ACCESSORY CR1

## 2018-08-07 PROCEDURE — 12000001 ZZH R&B MED SURG/OB UMMC

## 2018-08-07 PROCEDURE — 99152 MOD SED SAME PHYS/QHP 5/>YRS: CPT

## 2018-08-07 PROCEDURE — C1729 CATH, DRAINAGE: HCPCS

## 2018-08-07 PROCEDURE — 25000128 H RX IP 250 OP 636: Performed by: STUDENT IN AN ORGANIZED HEALTH CARE EDUCATION/TRAINING PROGRAM

## 2018-08-07 RX ORDER — PROCHLORPERAZINE 25 MG
12.5 SUPPOSITORY, RECTAL RECTAL EVERY 12 HOURS PRN
Status: DISCONTINUED | OUTPATIENT
Start: 2018-08-07 | End: 2018-08-09 | Stop reason: HOSPADM

## 2018-08-07 RX ORDER — ACETAMINOPHEN 325 MG/1
650 TABLET ORAL EVERY 4 HOURS PRN
Status: DISCONTINUED | OUTPATIENT
Start: 2018-08-07 | End: 2018-08-09 | Stop reason: HOSPADM

## 2018-08-07 RX ORDER — METOCLOPRAMIDE 5 MG/1
5 TABLET ORAL EVERY 6 HOURS PRN
Status: DISCONTINUED | OUTPATIENT
Start: 2018-08-07 | End: 2018-08-09 | Stop reason: HOSPADM

## 2018-08-07 RX ORDER — BRIMONIDINE TARTRATE 2 MG/ML
1 SOLUTION/ DROPS OPHTHALMIC DAILY
Status: DISCONTINUED | OUTPATIENT
Start: 2018-08-07 | End: 2018-08-09 | Stop reason: HOSPADM

## 2018-08-07 RX ORDER — PIPERACILLIN SODIUM, TAZOBACTAM SODIUM 3; .375 G/15ML; G/15ML
3.38 INJECTION, POWDER, LYOPHILIZED, FOR SOLUTION INTRAVENOUS EVERY 6 HOURS
Status: DISCONTINUED | OUTPATIENT
Start: 2018-08-07 | End: 2018-08-09

## 2018-08-07 RX ORDER — FLUMAZENIL 0.1 MG/ML
0.2 INJECTION, SOLUTION INTRAVENOUS
Status: DISCONTINUED | OUTPATIENT
Start: 2018-08-07 | End: 2018-08-07 | Stop reason: HOSPADM

## 2018-08-07 RX ORDER — NALOXONE HYDROCHLORIDE 0.4 MG/ML
.1-.4 INJECTION, SOLUTION INTRAMUSCULAR; INTRAVENOUS; SUBCUTANEOUS
Status: DISCONTINUED | OUTPATIENT
Start: 2018-08-07 | End: 2018-08-07 | Stop reason: HOSPADM

## 2018-08-07 RX ORDER — AMPICILLIN AND SULBACTAM 2; 1 G/1; G/1
3 INJECTION, POWDER, FOR SOLUTION INTRAMUSCULAR; INTRAVENOUS
Status: DISCONTINUED | OUTPATIENT
Start: 2018-08-07 | End: 2018-08-07 | Stop reason: CLARIF

## 2018-08-07 RX ORDER — NALOXONE HYDROCHLORIDE 0.4 MG/ML
.1-.4 INJECTION, SOLUTION INTRAMUSCULAR; INTRAVENOUS; SUBCUTANEOUS
Status: DISCONTINUED | OUTPATIENT
Start: 2018-08-07 | End: 2018-08-09 | Stop reason: HOSPADM

## 2018-08-07 RX ORDER — SODIUM CHLORIDE 9 MG/ML
INJECTION, SOLUTION INTRAVENOUS CONTINUOUS
Status: DISCONTINUED | OUTPATIENT
Start: 2018-08-07 | End: 2018-08-07

## 2018-08-07 RX ORDER — FENTANYL CITRATE 50 UG/ML
25-50 INJECTION, SOLUTION INTRAMUSCULAR; INTRAVENOUS EVERY 5 MIN PRN
Status: DISCONTINUED | OUTPATIENT
Start: 2018-08-07 | End: 2018-08-07 | Stop reason: HOSPADM

## 2018-08-07 RX ORDER — METOCLOPRAMIDE HYDROCHLORIDE 5 MG/ML
5 INJECTION INTRAMUSCULAR; INTRAVENOUS EVERY 6 HOURS PRN
Status: DISCONTINUED | OUTPATIENT
Start: 2018-08-07 | End: 2018-08-09 | Stop reason: HOSPADM

## 2018-08-07 RX ORDER — IBUPROFEN 200 MG
600 TABLET ORAL EVERY 6 HOURS PRN
Status: DISCONTINUED | OUTPATIENT
Start: 2018-08-07 | End: 2018-08-09 | Stop reason: HOSPADM

## 2018-08-07 RX ORDER — PROCHLORPERAZINE MALEATE 5 MG
5 TABLET ORAL EVERY 6 HOURS PRN
Status: DISCONTINUED | OUTPATIENT
Start: 2018-08-07 | End: 2018-08-09 | Stop reason: HOSPADM

## 2018-08-07 RX ORDER — LIDOCAINE 40 MG/G
CREAM TOPICAL
Status: DISCONTINUED | OUTPATIENT
Start: 2018-08-07 | End: 2018-08-07 | Stop reason: HOSPADM

## 2018-08-07 RX ADMIN — MIDAZOLAM 2 MG: 1 INJECTION INTRAMUSCULAR; INTRAVENOUS at 19:07

## 2018-08-07 RX ADMIN — PIPERACILLIN SODIUM AND TAZOBACTAM SODIUM 3.38 G: 3; .375 INJECTION, POWDER, LYOPHILIZED, FOR SOLUTION INTRAVENOUS at 05:43

## 2018-08-07 RX ADMIN — PIPERACILLIN SODIUM AND TAZOBACTAM SODIUM 3.38 G: 3; .375 INJECTION, POWDER, LYOPHILIZED, FOR SOLUTION INTRAVENOUS at 17:38

## 2018-08-07 RX ADMIN — BRIMONIDINE TARTRATE 1 DROP: 2 SOLUTION/ DROPS OPHTHALMIC at 08:51

## 2018-08-07 RX ADMIN — FENTANYL CITRATE 100 MCG: 50 INJECTION INTRAMUSCULAR; INTRAVENOUS at 19:08

## 2018-08-07 RX ADMIN — PIPERACILLIN SODIUM AND TAZOBACTAM SODIUM 3.38 G: 3; .375 INJECTION, POWDER, LYOPHILIZED, FOR SOLUTION INTRAVENOUS at 12:58

## 2018-08-07 RX ADMIN — ACETAMINOPHEN 650 MG: 325 TABLET, FILM COATED ORAL at 15:53

## 2018-08-07 RX ADMIN — SODIUM CHLORIDE: 9 INJECTION, SOLUTION INTRAVENOUS at 05:42

## 2018-08-07 RX ADMIN — LIDOCAINE HYDROCHLORIDE 3 ML: 10 INJECTION, SOLUTION EPIDURAL; INFILTRATION; INTRACAUDAL; PERINEURAL at 19:07

## 2018-08-07 RX ADMIN — IBUPROFEN 600 MG: 200 TABLET, FILM COATED ORAL at 20:37

## 2018-08-07 RX ADMIN — BIMATOPROST 1 DROP: 0.1 SOLUTION/ DROPS OPHTHALMIC at 08:50

## 2018-08-07 ASSESSMENT — ENCOUNTER SYMPTOMS
CONSTIPATION: 0
LIGHT-HEADEDNESS: 1
CHILLS: 0
DIARRHEA: 0
NAUSEA: 0
VOMITING: 0
CHEST TIGHTNESS: 0
HEADACHES: 0
FEVER: 0
FATIGUE: 1
APPETITE CHANGE: 1
ABDOMINAL PAIN: 0
COUGH: 1

## 2018-08-07 ASSESSMENT — ACTIVITIES OF DAILY LIVING (ADL)
ADLS_ACUITY_SCORE: 10
ADLS_ACUITY_SCORE: 10
ADLS_ACUITY_SCORE: 9
ADLS_ACUITY_SCORE: 10
ADLS_ACUITY_SCORE: 10

## 2018-08-07 NOTE — CONSULTS
Patient is on IR schedule 8/7/2018 for a Cholecystostomy tube placement.   Labs WNL for procedure.   Orders for NPO, scrubs and antibiotics have been entered.   Consent will be done prior to procedure.   Please contact the IR charge RN at [phone] for estimated time of procedure.       Nadia Golden IR RPA  [phone] Call pager  [phone] pager

## 2018-08-07 NOTE — PROGRESS NOTES
Attestation:  This patient has been seen and evaluated by me, Carlos Judd on 8/7/2018.  I saw and discussed the case with the primary resident and the care team. I agree with the findings and plan in this note. I have reviewed today's vital signs, medications, laboratory results and imaging results.    [facility]          [facility], [facility] Progress Note - [facility] Service       Main Plans for Today   Surgery Consult  NPO  Cont IV abx    Assessment & Plan   Abrahan Hagen is a 72 year old male admitted on 8/7/2018. He has a history of esophageal atresia s/p multiple abdominal surgeries including colonic interposition, GERD, chronic ILD and glaucoma and is admitted for phlegmon/sub-hepatic abscess.     # Fatigue  # Weakness  # Fever  # Sub-hepatic abscess/phlegmon  Patient transferred from [facility] for further evaluation and management of sub-hepatic abscess found on CT. Patient is HD stable and afebrile on presentation with no outstanding findings on physical exam nor acute pain. Labs at OSH were remarkable for a leukocytosis of 12.2.   - Continue Zosyn 4.5 mg Q6H   - Pain management: tylenol and ibuprofen   - NS at 75 ml/hr   - Blood cultures pending  - Surgery consulted, recs appreciated  - NPO  - Trend CBC      Chronic medical conditions:  #Glaucoma: continue PTA eye drops     # Chronic interstitial lung disease: continue PTA Combivent PRN     # Pain Assessment:  Current Pain Score 8/7/2018   Patient currently in pain? denies   Pain score (0-10) -   Pain location -   Pain descriptors -   - Abrahan is experiencing pain due to Cholecystitis. Pain management was discussed and the plan was created in a collaborative fashion.  Abrahan's response to the current recommendations: engaged  - Pharmacologic adjuvants: Acetaminophen        Diet: NPO per Anesthesia Guidelines for Procedure/Surgery Except for: Meds  Fluids: NS @ 75 ml/hr  DVT  Prophylaxis: Pneumatic Compression Devices  Code Status: DNR / DNI    Disposition Plan   Expected discharge: 4 - 7 days, recommended to prior living arrangement once surgery consult, treatment for abscess/phlegmon. Dispo: Expected Discharge Date: 08/10/18        Entered: Christopher Key 08/07/2018, 9:56 AM   Information in the above section will display in the discharge planner report.      The patient's care was discussed with the Attending Physician, Dr. Judd.    Christopher Key  [facility]  Pager: [phone]  Please see sticky note for cross cover information    Interval History   Patient with minimal pain this AM. Did fever overnight. No nausea or vomiting. Was able to get some sleep. Is NPO pending surgical consult. Has had minimal appetite the past few days    Physical Exam   Vital Signs: Temp: 99.8  F (37.7  C) Temp src: Oral BP: 122/59 Pulse: 78   Resp: 16 SpO2: 99 % O2 Device: None (Room air)    Weight: 157 lbs 0 oz    Constitutional: He is oriented to person, place, and time. He appears well-developed and well-nourished. No distress.     Cardiovascular: Normal rate, regular rhythm and intact distal pulses.  Exam reveals no gallop and no friction rub.    Murmur (systolic) heard.  Pulmonary/Chest: Effort normal and breath sounds normal. No respiratory distress. He has no wheezes. He has no rales.   Suprasternal colonic conduit palpable with air.    Abdominal: Soft. Bowel sounds are normal. He exhibits no distension. There is no tenderness. There is no rebound and no guarding.   Multiple well heeled abdominal incisions   Neurological: He is alert and oriented to person, place, and time.   Skin: Skin is warm and dry.   Psychiatric: He has a normal mood and affect. Thought content normal.     Data

## 2018-08-07 NOTE — LETTER
Transition Communication Hand-off for Care Transitions to Next Level of Care Provider    Name: Abrahan Hagen  : 1945  MRN #: [id number]  Primary Care Provider: Ananda Jones     Primary Clinic: [facility] [address] [place], [facility] [address]     Reason for Hospitalization:    Liver abscess  Phlegmon    Admit Date/Time: 2018  2:30 AM  Discharge Date: 2018    Payor Source: Payor: [facility] / Plan: [facility] S[employer]IORS NON FPA / Product Type: HMO /       Any outstanding tests or procedures:        Referrals     Future Labs/Procedures    General Surg Adult Referral     Comments:    Please schedule in 3-4 weeks per General Surgery team. See their note for details. He will need biliary drain removed and eval for cholecystectomy            Key Recommendations:  Please review AVS    May Garza RN, BSN, PHN  Medicine Care Coordinator  Jar[address], Raghu 2, 5 & 9 and Kate  Phone: [phone] / Pager: [phone]      AVS/Discharge Summary is the source of truth; this is a helpful guide for improved communication of patient story

## 2018-08-07 NOTE — CONSULTS
Surgery Consultation Note    Abrahan Hagen  MRN: [id number]  male  72 year old    Chief Complaint:  Subhepatic abscess/phlegmon    HPI:  Abrahan Hgaen is a 71 yo M with a complex PMH including congenital esophageal atresia s/p colonic interposition, GERD, chronic ILD, and recent admission for medically managed acute cholecystitis who presented today to [place] ED for 1d h/o fatigue and fevers - febrile to 102.9 with other VSS in ED, denies any abd pain. Further w/u includes WBC 12.2, hgb/lactate/CMP/lipase WNL; CT showed sub-hepatic abscess/phlegmon prompting pip/tazo and call for transfer to the [facility] (IR not available at [place]). General surgery was contacted at the time of triage call and recommended medical management with possible IR consult.  Pt currently has no complaints and denies abd pain - main concern was for PTA 1d h/o extreme fatigue and dehydration.    Patient Active Problem List   Diagnosis     Acute cholecystitis     Chronic interstitial lung disease (H)     Congenital atresia of esophagus     GERD (gastroesophageal reflux disease)     Hard of hearing     Inguinal hernia bilateral, non-recurrent     Pulmonary nodule     Phlegmon     Past Surgical History:   Past Surgical History:   Procedure Laterality Date     ABDOMEN SURGERY      10 surgeries before 6yo for congenital atresia of esophagus, 11th surgery done in 1973 for ulceration of colonic conduit     APPENDECTOMY       HERNIA REPAIR      laparoscopic inguinal     Past Medical History:   Past Medical History:   Diagnosis Date     Chronic interstitial lung disease (H)      Congenital atresia of esophagus     s/p surgical repair w/ colonic conduit      GERD (gastroesophageal reflux disease)      Glaucoma      Inguinal hernia unilateral, non-recurrent     s/p repair     Social History:   Social History   Substance Use Topics     Smoking status: Former Smoker     Packs/day: 1.00     Years: 39.00     Smokeless tobacco: Former User     Quit date: 1/3/1999      Alcohol use Not on file      Comment: once in a great while, less than 1 per month     Family History:   Family History   Problem Relation Age of Onset     Diabetes Mother      Colon Cancer Father      Diabetes Maternal Grandfather       Allergies: No Known Allergies    Active Medications:   No current outpatient prescriptions on file.     ROS:  Otherwise negative    Physical Examination:   Vital Signs: /62  Pulse 74  Temp 98.9  F (37.2  C) (Oral)  Resp 16  SpO2 95%  GEN: alert, healthy, no distress  EENT: normal  Chest: NLB on RA, regular rate  Abdomen: soft, non-tender in all 4 quadrants even to deep palpation, non-distended  Extremities: WWP, no edema    Labs/Imaging/Other:  Results for orders placed or performed during the hospital encounter of 08/07/18 (from the past 24 hour(s))   CBC with platelets   Result Value Ref Range    WBC 10.6 4.0 - 11.0 10e9/L    RBC Count 4.26 (L) 4.4 - 5.9 10e12/L    Hemoglobin 12.4 (L) 13.3 - 17.7 g/dL    Hematocrit 37.7 (L) 40.0 - 53.0 %    MCV 89 78 - 100 fl    MCH 29.1 26.5 - 33.0 pg    MCHC 32.9 31.5 - 36.5 g/dL    RDW 15.1 (H) 10.0 - 15.0 %    Platelet Count 128 (L) 150 - 450 10e9/L   Basic metabolic panel   Result Value Ref Range    Sodium 137 133 - 144 mmol/L    Potassium 3.7 3.4 - 5.3 mmol/L    Chloride 106 94 - 109 mmol/L    Carbon Dioxide 23 20 - 32 mmol/L    Anion Gap 8 3 - 14 mmol/L    Glucose 100 (H) 70 - 99 mg/dL    Urea Nitrogen 13 7 - 30 mg/dL    Creatinine 0.68 0.66 - 1.25 mg/dL    GFR Estimate >90 >60 mL/min/1.7m2    GFR Estimate If Black >90 >60 mL/min/1.7m2    Calcium 8.4 (L) 8.5 - 10.1 mg/dL   Blood culture   Result Value Ref Range    Specimen Description Blood Right Hand     Special Requests Received in aerobic bottle only     Culture Micro No growth after 1 hour    Blood culture   Result Value Ref Range    Specimen Description Blood Left Hand     Special Requests Received in aerobic bottle only     Culture Micro PENDING        Assessment & Plan:  72  year old male h/o congenital esophageal atresia s/p colonic interposition recently admitted for medically managed acute cholecystitis p/w extreme fatigue, dehydration, fever & CT showing sub-hepatic abscess vs phlegmon - no abd pain, normal hepatic panel, normal WBC. OSH CT available on PACS.  - Recommend IR consult for possible drainage  - NPO for now  - No surgical intervention at this time  - General surgery will continue to follow      D/w chief resident    Halley Jett MD  Surgery PGY1

## 2018-08-07 NOTE — IR NOTE
Patient Name: Abrahan Hagen  Medical Record Number: [id number]  Today's Date: 8/7/2018    Procedure: Biliary Drain Placement  Proceduralist: Dr. Sahil Campo, Dr. Macho Christensen    Sedation start time: 1840  Sedation end time: 1855  Sedation medications administered: Fentanyl 100mcg, Versed 2mg   Total sedation time: 15 minutes    Procedure start time: 1837  Puncture time: 1840  Procedure end time: 1920    Report given to: KWESI Magana       Other Notes: Pt arrived to IR room 4 from . Pt denies any questions or concerns regarding procedure. Pt positioned supine and monitored per protocol. Fluid collection identified. 8fr skater drain placed and attached to gravity bag. Dressing applied. 120cc aspirate sent for analysis. Upon departure- alert, oriented, calm. Respirations eupneic on room air. Pt tolerated procedure without any noted complications. Pt transferred back to  via cart.

## 2018-08-07 NOTE — PLAN OF CARE
Problem: Patient Care Overview  Goal: Plan of Care/Patient Progress Review  Vitals stable,denied increase work of breathing,on oxygen at 2l/nasal cannula,sats 98%..Left side old chest tube site comfortably managed with prn tylenol.Discharge order written by provider,pt needs home oxygen,walk test  for home oxygen need done and documented.Nursing care coordinator is arranging for home oxygen supply.Would discharge patient as soon as  discharge med and needed home supply is completed.

## 2018-08-07 NOTE — H&P
"[facility] Family Medicine History and Physical        Date of Admission:  8/7/2018  Date of Service: 8/7/2018         HPI      Chief Complaint   Sub-hepatic abscess/phlegmon     History is obtained from the patient    History of Present Illness   Abrahan Hagen is a 72 year old male who has a history of esophageal atresia s/p multiple abdominal surgeries including colonic interposition, chronic ILD and glaucoma and was transferred from [facility] for further management at [facility] for sub-hepatic abscess    Patient was recently admitted to our service from 7/3 to 7/9 for acute cholecystitis that was conservatively managed. Patient reports doing well since discharge and had returned to work.     Patient reports on Sunday noting a poor appetite and feeling more week. 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 When he went to work yesterday he felt very fatigue, \"like I was a Zombie\", that prompted him to go to bed at 4PM. Patient woke up around 8PM to use the restroom, but felt to weak to get out of bed. Patient needed assistance from family members to get out of bed. His wife, April, was concerned with the new onset and requested he be seen at the ED. Upon assessment, he denies fever though present at OSH. He denies headache, blurry vision, SOB, n/v, abdominal pain, CT, CP, constipation or diarrhea. He endorses a chronic cough and mild lightheadedness.     At OSH Ed patient was hemodynamically stable thought febrile to 102.9. He was given PO tylenol and 2L of NS. Labs done at OSH included: BMP, CBC, UA, lipase, troponin and blood cultures. Lactic acid was WNL at 0.6. BMP with mild hyponatremia of 133, otherwise unremarkable. Hepatic panel had minimally elevated direct bilirubin of 0.6, otherwise unremarkable. Lipase WNL. Negative troponin. CBC had a leukocytosis of 12.2 and hemoglobin of 12.6 with normal MCV of 88. UA had " small leukocyte esterase and 11-25 WBC with no nitrates. Blood cultures were drawn. CT abd/pelvis shows an ill-defined area of low denisty extending from along liver concerning fo cholecystitis with phelgmonous change/subhepatic abscess.  Patient was given 1 dose of 4.5 mg Zosyn. Patient was then transferred by ground [facility].            History:   Review of Systems   The 10 point Review of Systems is negative other than noted in the HPI or here.  Review of Systems   Constitutional: Positive for appetite change (decreased) and fatigue. Negative for chills and fever.   Eyes: Negative for visual disturbance.   Respiratory: Positive for cough (chronic productive). Negative for chest tightness.    Cardiovascular: Negative for chest pain.   Gastrointestinal: Negative for abdominal pain, constipation, diarrhea, nausea and vomiting.   Neurological: Positive for light-headedness. Negative for headaches.       Past Medical History    I have reviewed this patient's medical history and updated it with pertinent information if needed.   Past Medical History:   Diagnosis Date     Chronic interstitial lung disease (H)      Congenital atresia of esophagus     s/p surgical repair w/ colonic conduit      GERD (gastroesophageal reflux disease)      Glaucoma      Inguinal hernia unilateral, non-recurrent     s/p repair      Past Surgical History   I have reviewed this patient's surgical history and updated it with pertinent information if needed.  Past Surgical History:   Procedure Laterality Date     ABDOMEN SURGERY      10 surgeries before 4yo for congenital atresia of esophagus, 11th surgery done in 1973 for ulceration of colonic conduit     APPENDECTOMY       HERNIA REPAIR      laparoscopic inguinal      Social History   Social History   Substance Use Topics     Smoking status: Former Smoker     Packs/day: 1.00     Years: 39.00     Smokeless tobacco: Former User     Quit date: 1/3/1999     Alcohol use Not on file      Comment:  once in a great while, less than 1 per month   Patient lives with wife, daughter, grand daughter and grandson. There are both a cat and dog in the home.     Family History   I have reviewed this patient's family history and updated it with pertinent information if needed.   Family History   Problem Relation Age of Onset     Diabetes Mother      Colon Cancer Father      Diabetes Maternal Grandfather        Prior to Admission Medications   Prior to Admission Medications   Prescriptions Last Dose Informant Patient Reported? Taking?   Ipratropium-Albuterol (COMBIVENT RESPIMAT)  MCG/ACT inhaler   Yes No   Sig: Inhale 1 puff into the lungs daily as needed   bimatoprost (LUMIGAN) 0.01 % SOLN   Yes No   Sig: Place 1 drop into both eyes daily   brimonidine (ALPHAGAN P) 0.1 % ophthalmic solution   Yes No   Sig: Place 1 drop into both eyes daily   clotrimazole-betamethasone (LOTRISONE) cream   Yes No   Sig: Apply topically 2 times daily as needed   oxyCODONE-acetaminophen (PERCOCET) 5-325 MG per tablet   No No   Sig: Take 1 tablet by mouth every 6 hours as needed for pain      Facility-Administered Medications: None     Allergies   No Known Allergies        Physical Exam      Vital Signs: Temp: 98.9  F (37.2  C) Temp src: Oral BP: 122/62 Pulse: 74   Resp: 16 SpO2: 95 % O2 Device: None (Room air)    Weight: 0 lbs 0 oz    Physical Exam   Constitutional: He is oriented to person, place, and time. He appears well-developed and well-nourished. No distress.   HENT:   Head: Normocephalic and atraumatic.   Eyes: Conjunctivae are normal. No scleral icterus.   Cardiovascular: Normal rate, regular rhythm and intact distal pulses.  Exam reveals no gallop and no friction rub.    Murmur (systolic) heard.  Pulmonary/Chest: Effort normal and breath sounds normal. No respiratory distress. He has no wheezes. He has no rales.   Suprasternal colonic conduit palpable with air.    Abdominal: Soft. Bowel sounds are normal. He exhibits no  distension. There is no tenderness. There is no rebound and no guarding.   Multiple well heeled abdominal incisions   Neurological: He is alert and oriented to person, place, and time.   Skin: Skin is warm and dry.   Psychiatric: He has a normal mood and affect. Thought content normal.       Assessment & Plan      Abrahan Hagen is a 72 year old male admitted on 8/7/2018. He has a history of esophageal atresia s/p multiple abdominal surgeries including colonic interposition, GERD, chronic ILD and glaucoma and is admitted for phlegmon/sub-hepatic abscess.    # Fatigue  # Weakness  # Fever  # Sub-hepatic abscess/phlegmon  Patient transferred from [facility] for further evaluation and management of sub-hepatic abscess found on CT. Patient is HD stable and afebrile on presentation with no outstanding findings on physical exam nor acute pain. Labs at OSH were remarkable for a leukocytosis of 12.2.   - Continue Zosyn 4.5 mg Q6H   - Pain management: tylenol and ibuprofen   - NS at 75 ml/hr   - Blood cultures x2 at OSH  - Surgery consulted, recs appreciated  - NPO  - Trend CBC     Chronic medical conditions:  #Glaucoma: continue PTA eye drops    # Chornic interstitial lung disease: continue PTA Combivent PRN      # Pain Assessment:  Current Pain Score 8/7/2018   Patient currently in pain? denies   Pain score (0-10) -   Pain location -   Pain descriptors -   Abrahan rodrigez pain level was assessed and he currently denies pain.      Diet: NPO per Anesthesia Guidelines for Procedure/Surgery Except for: Meds  Fluids: 75 ml/hr NS  DVT Prophylaxis: Pneumatic Compression Devices  Code Status: DNR / DNI    Disposition Plan   Expected discharge: 2 - 3 days; recommended to prior living arrangement once further evaluation and established management of abscess/phelgmon . Dispo: Expected Discharge Date: 08/10/18       Entered: Mayra Lester 08/07/2018, 5:28 AM   Information in the above section will display in the discharge planner  report.    The patient was discussed with Dr. Corbin Peralta's Family Medicine Inpatient Service  [facility]   Pager: [phone]  Please see sticky note for cross cover information      Results:     Data     From OSH  SODIUM 133 (L) 135 - 145 mmol/L   POTASSIUM 3.8 3.5 - 5.0 mmol/L   CHLORIDE 99 98 - 110 mmol/L   CO2,TOTAL 25 21 - 31 mmol/L   ANION GAP 9 5 - 18    GLUCOSE 116 (H) 65 - 100 mg/dL   CALCIUM 9.5 8.5 - 10.5 mg/dL   BUN 15 8 - 25 mg/dL   CREATININE 0.73 0.72 - 1.25 mg/dL   BUN/CREAT RATIO  21 (H) 10 - 20    GFR if African American >60 >60 ml/min/1.73m2   GFR if not African American >60 >60 ml/min/1.73m2     LACTATE,VENOUS 0.6     INR 1.2 <1.3   PROTIME 15.1 (H) 11.9 - 13.9 sec     ALBUMIN 3.6 3.2 - 4.6 g/dL   PROTEIN,TOTAL 6.9 6.0 - 8.0 g/dL   GLOBULIN  3.3 2.0 - 3.7 g/dL   A/G RATIO 1.1 1.0 - 2.0    BILIRUBIN,TOTAL 1.2 0.2 - 1.2 mg/dL   BILIRUBIN,DIRECT 0.6 (H) 0.1 - 0.5 mg/dL   BILIRUBIN,INDIRECT  0.6 0.2 - 0.8 mg/dL   ALK PHOSPHATASE 99 50 - 136 IU/L   ALT (SGPT) 18 8 - 45 IU/L   AST (SGOT) 18 2 - 40 IU/L     LIPASE <5.0 (L) 8.0 - 78.0 IU/L     TROPONIN I 0.010 <0.034 ng/mL     WHITE BLOOD COUNT  12.2 (H) 4.5 - 11.0 thou/cu mm   RED BLOOD COUNT  4.30 4.30 - 5.90 mil/cu mm   HEMOGLOBIN  12.6 (L) 13.5 - 17.5 g/dL   HEMATOCRIT  37.7 37.0 - 53.0 %   MCV  88 80 - 100 fL   MCH  29.3 26.0 - 34.0 pg   MCHC  33.4 32.0 - 36.0 g/dL   RDW  14.9 11.5 - 15.5 %   PLATELET COUNT  154 140 - 440 thou/cu mm   MPV  9.1 6.5 - 11.0 fL   NEUTROPHILS  76.3 %   LYMPHOCYTES  7.9 %   MONOCYTES  15.4 %   EOSINOPHILS  0.2 %   BASOPHILS  0.2 %   ABSOLUTE NEUTROPHILS  9.3 (H) 1.7 - 7.0 thou/cu mm   ABSOLUTE LYMPHOCYTES  1.0 0.9 - 2.9 thou/cu mm   ABSOLUTE MONOCYTES  1.9 (H) <0.9 thou/cu mm   ABSOLUTE EOSINOPHILS  0.0 <0.5 thou/cu mm   ABSOLUTE BASOPHILS  0.0 <0.3 thou/cu mm     COLOR  Yellow Yellow Color   CLARITY  Clear Clear Clarity   SPECIFIC GRAVITY,URINE  <=1.005 (A) 1.010, 1.015, 1.020, 1.025     PH,URINE  6.0 6.0, 7.0, 8.0, 5.5, 6.5, 7.5, 8.5    UROBILINOGEN,QUALITATIVE  Normal Normal EU/dl   PROTEIN, URINE Negative Negative mg/dL   GLUCOSE, URINE Negative Negative mg/dL   KETONES,URINE  Negative Negative mg/dL   BILIRUBIN,URINE  Negative Negative    OCCULT BLOOD,URINE  Negative Negative    NITRITE  Negative Negative    LEUKOCYTE ESTERASE  Small (A) Negative      RBC 0-2 0-2, None Seen /HPF   WBC 11-25 (A) 0-2, 3-5, None Seen /HPF   BACTERIA  Few None Seen, Rare, Few Bacteria/HPF   EPITHELIAL CELLS  Few None Seen, Few Epi/HPF   Mucus Present     WHITE CELL CLUMPS Present (A) (none)

## 2018-08-07 NOTE — LETTER
[address] [facility]  [address] [id number]  [phone]          August 9, 2018    RE:  Abrahan Hagen                                                                                                                                                       [address] [facility] [address]            To whom it may concern:    Abrahan Hagen is under my professional care for Acute Cholecystitis.  He  may return to work with the following: The employee is ABLE to return to work 8/10    When the patient returns to work, the following restrictions apply until 8/25/18:  A) Bend: Occasionally (4-8 hours)  B) Squat: Occasionally (4-8 hours)  C) Walk/Stand: Occasionally (4-8 hours)  D) Reach Above Shoulders: Occasionally (4-8 hours)  E) Lift, carry, push, and pull no more than:  21-30 lbs.Light duty is warranted while his body recovers.      Sincerely,         Christopher Key, DO

## 2018-08-07 NOTE — IP AVS SNAPSHOT
[address] [address]    Phone:  [phone]                                       After Visit Summary   8/7/2018    Abrahan Hagen    MRN: [id number]           After Visit Summary Signature Page     I have received my discharge instructions, and my questions have been answered. I have discussed any challenges I see with this plan with the nurse or doctor.    ..........................................................................................................................................  Patient/Patient Representative Signature      ..........................................................................................................................................  Patient Representative Print Name and Relationship to Patient    ..................................................               ................................................  Date                                            Time    ..........................................................................................................................................  Reviewed by Signature/Title    ...................................................              ..............................................  Date                                                            Time

## 2018-08-07 NOTE — IP AVS SNAPSHOT
MRN:[id number]                      After Visit Summary   8/7/2018    Abrahan Hagen    MRN: [id number]           Thank you!     Thank you for choosing [facility] for your care. Our goal is always to provide you with excellent care. Hearing back from our patients is one way we can continue to improve our services. Please take a few minutes to complete the written survey that you may receive in the mail after you visit with us. Thank you!        Patient Information     Date Of Birth          1945        Designated Caregiver       Most Recent Value    Caregiver    Will someone help with your care after discharge? yes    Name of designated caregiver April     Phone number of caregiver [phone]    Caregiver address [place]       About your hospital stay     You were admitted on:  August 7, 2018 You last received care in the:  [address] [facility]    You were discharged on:  August 9, 2018        Reason for your hospital stay       Gallbladder infection                  Who to Call     For medical emergencies, please call 911.  For non-urgent questions about your medical care, please call your primary care provider or clinic, [phone]          Attending Provider     Provider Specialty    Margareth Foreman MD Internal Medicine    [facility], Carlos Kraft MD Family Practice       Primary Care Provider Office Phone # Fax #    Ananda Jones [phone]      After Care Instructions     Activity       Your activity upon discharge: activity as tolerated            Diet       Follow this diet upon discharge: Orders Placed This Encounter      Low Fat Diet            Tubes and drains       You are going home with the following tubes or drains: Biliary drain.  Tube cares per hospital or home care instructions                  Follow-up Appointments     Adult [facility] Follow-up and recommended labs and tests       Follow up with General surgery clinic at [facility] in 3-4 weeks to have drain  "removed and to discuss removing gallbladder    Appointments on [place] and/or [facility] (with [facility] or [facility] provider or service). Call [phone] if you haven't heard regarding these appointments within 7 days of discharge.            Follow Up and recommended labs and tests       Follow up with primary care provider, Ananda Jones, within 7 days for hospital follow- up.  The following labs/tests are recommended: CMP, CBC.                  Additional Services     General Surg Adult Referral       Please schedule in 3-4 weeks per General Surgery team. See their note for details. He will need biliary drain removed and eval for cholecystectomy                  Pending Results     Date and Time Order Name Status Description    8/7/2018 1905 Anaerobic bacterial culture Preliminary     8/7/2018 0617 Blood culture Preliminary     8/7/2018 0611 Blood culture Preliminary             Statement of Approval     Ordered          08/09/18 2039  I have reviewed and agree with all the recommendations and orders detailed in this document.  EFFECTIVE NOW     Approved and electronically signed by:  Christopher Key DO             Admission Information     Date & Time Provider Department Dept. Phone    8/7/2018 Carlos Judd MD [address] [facility] [place] [phone]      Your Vitals Were     Blood Pressure Pulse Temperature Respirations Weight Pulse Oximetry    133/77 (BP Location: Left arm) 82 98.5  F (36.9  C) (Oral) 16 73.9 kg (163 lb) 95%    BMI (Body Mass Index)                   24.78 kg/m2           [employer] Information     [employer] lets you send messages to your doctor, view your test results, renew your prescriptions, schedule appointments and more. To sign up, go to [email or web address] . Click on \"Log in\" on the left side of the screen, which will take you to the Welcome page. Then click on \"Sign up Now\" on the right side of the page.     You will be asked to enter the access code listed below, as well as " some personal information. Please follow the directions to create your username and password.     Your access code is: [email or web address]  Expires: 10/7/2018  2:44 PM     Your access code will  in 90 days. If you need help or a new code, please call your [facility] clinic or [phone].        Care EveryWhere ID     This is your Care EveryWhere ID. This could be used by other organizations to access your [facility] medical records  [id number]        Equal Access to Services     ASHTYN CASTRO : Hadii norm curran hadasho Soomaali, waaxda luqadaha, qaybta kaalmada adeegyada, nora segura hayalmaz davila . So [facility] [phone].    ATENCIÓN: Si habla español, tiene a seweney disposición servicios gratuitos de asistencia lingüística. Llame al [phone].    We comply with applicable federal civil rights laws and [place] laws. We do not discriminate on the basis of race, color, national origin, age, disability, sex, sexual orientation, or gender identity.               Review of your medicines      START taking        Dose / Directions    cefdinir 300 MG capsule   Commonly known as:  OMNICEF   Used for:  Acute cholecystitis        Dose:  300 mg   Take 1 capsule (300 mg) by mouth 2 times daily for 7 days   Quantity:  14 capsule   Refills:  0       oxyCODONE IR 5 MG tablet   Commonly known as:  ROXICODONE   Used for:  Acute cholecystitis        Dose:  5 mg   Take 1 tablet (5 mg) by mouth 2 times daily as needed for severe pain   Quantity:  10 tablet   Refills:  0       senna-docusate 8.6-50 MG per tablet   Commonly known as:  SENOKOT-S;PERICOLACE   Used for:  Acute cholecystitis        Dose:  1 tablet   Take 1 tablet by mouth 2 times daily   Quantity:  100 tablet   Refills:  0         CONTINUE these medicines which have NOT CHANGED        Dose / Directions    acetaminophen-caffeine 500-65 MG Tabs   Commonly known as:  EXCEDRIN TENSION HEADACHE        Dose:  2 tablet   Take 2 tablets by mouth every 6 hours as needed for  mild pain   Refills:  0       ALPHAGAN P 0.1 % ophthalmic solution   Generic drug:  brimonidine        Dose:  1 drop   Place 1 drop into both eyes 2 times daily   Refills:  0       clotrimazole-betamethasone cream   Commonly known as:  LOTRISONE        Apply topically 2 times daily as needed   Refills:  0       Ipratropium-Albuterol  MCG/ACT inhaler   Commonly known as:  COMBIVENT RESPIMAT        Dose:  1 puff   Inhale 1 puff into the lungs daily as needed   Refills:  0       LUMIGAN 0.01 % Soln   Generic drug:  bimatoprost        Dose:  1 drop   Place 1 drop into both eyes At Bedtime   Refills:  0            Where to get your medicines      These medications were sent to [facility] [address] [place] [address] [address]  [address], [facility] [address]     Phone:  [phone]     cefdinir 300 MG capsule    senna-docusate 8.6-50 MG per tablet         Some of these will need a paper prescription and others can be bought over the counter. Ask your nurse if you have questions.     Bring a paper prescription for each of these medications     oxyCODONE IR 5 MG tablet                Protect others around you: Learn how to safely use, store and throw away your medicines at www.disposemymeds.org.        ANTIBIOTIC INSTRUCTION     You've Been Prescribed an Antibiotic - Now What?  Your healthcare team thinks that you or your loved one might have an infection. Some infections can be treated with antibiotics, which are powerful, life-saving drugs. Like all medications, antibiotics have side effects and should only be used when necessary. There are some important things you should know about your antibiotic treatment.      Your healthcare team may run tests before you start taking an antibiotic.    Your team may take samples (e.g., from your blood, urine or other areas) to run tests to look for bacteria. These test can be important to determine if you need an antibiotic at all  and, if you do, which antibiotic will work best.      Within a few days, your healthcare team might change or even stop your antibiotic.    Your team may start you on an antibiotic while they are working to find out what is making you sick.    Your team might change your antibiotic because test results show that a different antibiotic would be better to treat your infection.    In some cases, once your team has more information, they learn that you do not need an antibiotic at all. They may find out that you don't have an infection, or that the antibiotic you're taking won't work against your infection. For example, an infection caused by a virus can't be treated with antibiotics. Staying on an antibiotic when you don't need it is more likely to be harmful than helpful.      You may experience side effects from your antibiotic.    Like all medications, antibiotics have side effects. Some of these can be serious.    Let you healthcare team know if you have any known allergies when you are admitted to the hospital.    One significant side effect of nearly all antibiotics is the risk of severe and sometimes deadly diarrhea caused by Clostridium difficile (C. Difficile). This occurs when a person takes antibiotics because some good germs are destroyed. Antibiotic use allows C. diificile to take over, putting patients at high risk for this serious infection.    As a patient or caregiver, it is important to understand your or your loved one's antibiotic treatment. It is especially important for caregivers to speak up when patients can't speak for themselves. Here are some important questions to ask your healthcare team.    What infection is this antibiotic treating and how do you know I have that infection?    What side effects might occur from this antibiotic?    How long will I need to take this antibiotic?    Is it safe to take this antibiotic with other medications or supplements (e.g., vitamins) that I am taking?      Are there any special directions I need to know about taking this antibiotic? For example, should I take it with food?    How will I be monitored to know whether my infection is responding to the antibiotic?    What tests may help to make sure the right antibiotic is prescribed for me?      Information provided by:  www.cdc.gov/getsmart  U.S. Department of Health and Human Services  Centers for disease Control and Prevention  National Center for Emerging and Zoonotic Infectious Diseases  Division of Healthcare Quality Promotion        Information about OPIOIDS     PRESCRIPTION OPIOIDS: WHAT YOU NEED TO KNOW   We gave you an opioid (narcotic) pain medicine. It is important to manage your pain, but opioids are not always the best choice. You should first try all the other options your care team gave you. Take this medicine for as short a time (and as few doses) as possible.    Some activities can increase your pain, such as bandage changes or therapy sessions. It may help to take your pain medicine 30 to 60 minutes before these activities. Reduce your stress by getting enough sleep, working on hobbies you enjoy and practicing relaxation or meditation. Talk to your care team about ways to manage your pain beyond prescription opioids.    These medicines have risks:    DO NOT drive when on new or higher doses of pain medicine. These medicines can affect your alertness and reaction times, and you could be arrested for driving under the influence (DUI). If you need to use opioids long-term, talk to your care team about driving.    DO NOT operate heavy machinery    DO NOT do any other dangerous activities while taking these medicines.    DO NOT drink any alcohol while taking these medicines.     If the opioid prescribed includes acetaminophen, DO NOT take with any other medicines that contain acetaminophen. Read all labels carefully. Look for the word  acetaminophen  or  Tylenol.  Ask your pharmacist if you have  questions or are unsure.    You can get addicted to pain medicines, especially if you have a history of addiction (chemical, alcohol or substance dependence). Talk to your care team about ways to reduce this risk.    All opioids tend to cause constipation. Drink plenty of water and eat foods that have a lot of fiber, such as fruits, vegetables, prune juice, apple juice and high-fiber cereal. Take a laxative (Miralax, milk of magnesia, Colace, Senna) if you don t move your bowels at least every other day. Other side effects include upset stomach, sleepiness, dizziness, throwing up, tolerance (needing more of the medicine to have the same effect), physical dependence and slowed breathing.    Store your pills in a secure place, locked if possible. We will not replace any lost or stolen medicine. If you don t finish your medicine, please throw away (dispose) as directed by your pharmacist. The [place] Pollution Control Agency has more information about safe disposal: https://www.pca.[facility].[place].us/living-green/managing-unwanted-medications             Medication List: This is a list of all your medications and when to take them. Check marks below indicate your daily home schedule. Keep this list as a reference.      Medications           Morning Afternoon Evening Bedtime As Needed    acetaminophen-caffeine 500-65 MG Tabs   Commonly known as:  EXCEDRIN TENSION HEADACHE   Take 2 tablets by mouth every 6 hours as needed for mild pain                                ALPHAGAN P 0.1 % ophthalmic solution   Place 1 drop into both eyes 2 times daily   Generic drug:  brimonidine                                cefdinir 300 MG capsule   Commonly known as:  OMNICEF   Take 1 capsule (300 mg) by mouth 2 times daily for 7 days   Last time this was given:  300 mg on 8/9/2018  1:13 PM                                clotrimazole-betamethasone cream   Commonly known as:  LOTRISONE   Apply topically 2 times daily as needed                                 Ipratropium-Albuterol  MCG/ACT inhaler   Commonly known as:  COMBIVENT RESPIMAT   Inhale 1 puff into the lungs daily as needed                                LUMIGAN 0.01 % Soln   Place 1 drop into both eyes At Bedtime   Last time this was given:  1 drop on 8/9/2018  8:37 AM   Generic drug:  bimatoprost                                oxyCODONE IR 5 MG tablet   Commonly known as:  ROXICODONE   Take 1 tablet (5 mg) by mouth 2 times daily as needed for severe pain   Last time this was given:  5 mg on 8/8/2018  8:29 PM                                senna-docusate 8.6-50 MG per tablet   Commonly known as:  SENOKOT-S;PERICOLACE   Take 1 tablet by mouth 2 times daily

## 2018-08-07 NOTE — CONSULTS
GASTROENTEROLOGY CONSULTATION      Date of Admission:  8/7/2018           Reason for Consultation:   We were asked by the primary care team Dr Mayra Lester to evaluate this patient with cholecystitis.           ASSESSMENT AND RECOMMENDATIONS:   Assessment:  Mr. 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 Evaluated by GI during last hospitalization with option to drain gallbladder transgastrically.  However, this was deferred, as pt improved clinically.  Unfortunately, transgastric clearance of current collection is unlikely to be as effective as percutaneous drainage.  Agree with plan for IR drain placement.  Also, appreciate surgery input.  If Mr. Hagen develops a chronic bile leak after clearance of infected tissue/debris, may need ERCP and stenting to treat bile leak.    Recommendations  - Appreciate IR drain placement  - Appreciate surgery evaluation  - Continue antibiotics, per primary team  - Continue diet as tolerated  - Monitor closely for persistent bile leak following treatment of acute process, may need future ERCP      Thank you for involving us in this patient's care. Please do not hesitate to contact the GI service with any questions or concerns.      Note co-written with Ramya Moise, Medical Student     Discussed with Dr. Knott, who agrees with above.    Esme Randolph MD  GI Fellow  Pager: [phone]      -------------------------------------------------------------------------------------------------------------------           History of Present Illness:   Abrahan Hagen is a 72 year old male with a history of esophageal atresia s/p multiple abdominal surgeries including colonic interposition, GERD, chronic ILD, glaucoma and was admitted with complaints  of fever,fatigue and abdominal pain today morning. There was a history of previous admission for acute cholecystitis on 07/03/18 for 1 week during which patient was planned for endoscopic drainage of GB through stomach but it was not performed due to clinical improvement . The plan on discharge from the GI service was for PO antibiotics to complete a 10 day course with return for a same day procedure with GI if any recurrence of symptoms.    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 There was no fever, abdominal pain, nausea, vomiting, jaundice, lightheadedness, syncope, falls, constipation, diarrhea, GI bleed, SOB, or dysuria. He presented to OSH where he was febrile (102.9) . The patient had PO tylenol and 2L of NS. He consumes alcohol occasionally (once in 2 weeks) but stopped smoking >10 years ago.               Past Medical History:   Reviewed and edited as appropriate  Past Medical History:   Diagnosis Date     Chronic interstitial lung disease (H)      Congenital atresia of esophagus     s/p surgical repair w/ colonic conduit      GERD (gastroesophageal reflux disease)      Glaucoma      Inguinal hernia unilateral, non-recurrent     s/p repair            Past Surgical History:   Reviewed and edited as appropriate   Past Surgical History:   Procedure Laterality Date     ABDOMEN SURGERY      10 surgeries before 6yo for congenital atresia of esophagus, 11th surgery done in 1973 for ulceration of colonic conduit     APPENDECTOMY       HERNIA REPAIR      laparoscopic inguinal            Social History:   Reviewed and edited as appropriate  Social History     Social History     Marital status:      Spouse name: N/A     Number of children: N/A     Years of education: N/A     Occupational History     Not on file.     Social History Main Topics     Smoking status: Former  Smoker     Packs/day: 1.00     Years: 39.00     Smokeless tobacco: Former User     Quit date: 1/3/1999     Alcohol use Not on file      Comment: once in a great while, less than 1 per month     Drug use: Not on file     Sexual activity: No     Other Topics Concern     Not on file     Social History Narrative    Lives in a house with grandson, daughter, granddaughter + her boyfriend. No help at home.     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     2 daughter (one daughter he does not speak to)            Family History:   Reviewed and edited as appropriate    Reports family history of diabetes.  Denies family history of colon cancer or other cancers.         Allergies:   Reviewed and edited as appropriate   No Known Allergies         Medications:     Prescriptions Prior to Admission   Medication Sig Dispense Refill Last Dose     acetaminophen-caffeine (EXCEDRIN TENSION HEADACHE) 500-65 MG TABS Take 2 tablets by mouth every 6 hours as needed for mild pain   Past Week at PRN     bimatoprost (LUMIGAN) 0.01 % SOLN Place 1 drop into both eyes At Bedtime    Past Week at Unknown time     brimonidine (ALPHAGAN P) 0.1 % ophthalmic solution Place 1 drop into both eyes 2 times daily    Past Week at Unknown time     clotrimazole-betamethasone (LOTRISONE) cream Apply topically 2 times daily as needed   Past Week at prn     Ipratropium-Albuterol (COMBIVENT RESPIMAT)  MCG/ACT inhaler Inhale 1 puff into the lungs daily as needed   Past Week at prn             Review of Systems:   A complete review of systems was performed and is negative except as noted in the HPI           Physical Exam:   /59 (BP Location: Left arm)  Pulse 78  Temp 99.8  F (37.7  C) (Oral)  Resp 16  Wt 71.2 kg (157 lb)  SpO2 99%  BMI 23.87 kg/m2  Wt:   Wt Readings from Last 2 Encounters:   08/07/18 71.2 kg (157 lb)   07/08/18 77.1 kg (170 lb)      Constitutional: cooperative, pleasant, not dyspneic/diaphoretic, no acute  distress  Eyes: Sclera anicteric   Ears/nose/mouth/throat: Normal oropharynx without ulcers or exudate, mucus membranes moist, hearing intact  CV: RRR, no edema  Respiratory: Unlabored breathing  Abd: Multiple, healed surgical scars present, tender to palpation of RUQ  Skin: Warm, perfused, no jaundice  Neuro: AAO x 3  MSK: Colonic interposition visible and palpable beneath skin over left chest wall         Data:   Labs and imaging below were independently reviewed and interpreted    BMP  Recent Labs  Lab 08/07/18  0611      POTASSIUM 3.7   CHLORIDE 106   JEFFRY 8.4*   CO2 23   BUN 13   CR 0.68   *     CBC  Recent Labs  Lab 08/07/18  0611   WBC 10.6   RBC 4.26*   HGB 12.4*   HCT 37.7*   MCV 89   MCH 29.1   MCHC 32.9   RDW 15.1*   *     Imaging:ALLINA ZAKIA  CT CHEST, ABDOMEN, AND PELVIS  8/6/2018 11:05 PM         INDICATION: Weak  TECHNIQUE: CT chest, abdomen, and pelvis. Dose reduction techniques were used.   IV CONTRAST: 100 mL Omnipaque 350  COMPARISON: 4/30/2018 and 7/2/2018.    FINDINGS:   CHEST: Calcified and noncalcified pleural plaques. Bronchiectasis and fibrotic  changes are again seen. Postsurgical change of the esophagus. Coronary artery  calcification. No mediastinal adenopathy.    ABDOMEN: Ill-definition of the margins of the gallbladder with areas of low  density extending adjacent to the liver. Findings best seen series 7 images  68-91. There is wall thickening of the adjacent stomach/duodenum. Spleen,  pancreas, adrenals and kidneys stable. Atherosclerotic aorta. Normal caliber  bowel.    PELVIS: Moderate amount of colonic stool. Prostate calcification.  Diverticulosis.    MUSCULOSKELETAL: Colonic interposition between the esophagus and stomach along  the left chest wall. Degenerative change osseous structures.    CONCLUSION:  1.  The gallbladder wall is ill-defined with areas of low density extending  along the liver concerning for cholecystitis with phlegmonous  change/subhepatic  abscess. The possibility of extension into the liver not excluded. Follow-up  recommended as underlying gallbladder mass would be difficult to exclude on this  exam.  2.  Remainder of findings similar to prior.    Seen and examined with GI fellow, agree with findings and recommendations.    Abhay Knott MD GI Staff

## 2018-08-07 NOTE — PROGRESS NOTES
[facility]  Transfer Triage Note     Date of call: 8/7/2018   Time of call: [id number]     Reason for Transfer:Patient has established care here  Procedure can be done here and not at referring hospital  Diagnosis: Sub-hepatic phlegmon versus abscess following recent cholecystitis     Outside Records: Not available  Additional records requested to be faxed to [phone].     Stability of Patient: Patient is vitally stable, with no critical labs, and will likely remain stable throughout the transfer process     Expected Time of Arrival for Transfer: 0-8 hours     Recommendations for Management and Stabilization: Not needed     Additional Comments: Abrahan Hagen is a 71 yo M with a complex past medical history including congenital esophageal atresia s/p colonic interposition, GERD, chronic ILD, and recent admission for medically managed acute cholecystitis who presented today to [facility] for fatigue and fevers of 1 day duration.  At [place] ED, patient was reportedly febrile to 102.9 DegF with normal HR and BP.  He was alert and oriented with fatigue but no acute pain.  Further evaluation revealed a leukocytosis to 12.2 with stable hemoglobin, normal lactate, CMP, and lipase.  CT Chest/Abd/Pelvis subsequently revealed a sub-hepatic abscess/phlegmon prompting pip/tazo administration and call for possible transfer/admission.  General surgery was contacted at the time of triage call and felt medical management with possible IR evaluation appears the most likely recommended course but they will see the patient in the AM.  IR is not available at [place] and thus patient was accepted to [facility], will be seen by Kathryn's Team.    Margareth Foreman MD  Internal Medicine and Pediatrics Hospitalist  Pager [phone]

## 2018-08-07 NOTE — PLAN OF CARE
Problem: Patient Care Overview  Goal: Plan of Care/Patient Progress Review  Outcome: No Change  Temp max 100.4,down to 99.8 at noon,other vitals stable.Denied pain or nausea,voiding without problems,urine tess and good volume.Patient is npo for biliary drain placement at interventional radiology sometimes today.IVF running at 75 ml/hr.Continue per plan of care.

## 2018-08-07 NOTE — PROGRESS NOTES
..Patient has been assessed for Home Oxygen needs. Oxygen readings:    *Pulse oximetry (SpO2) = 87% on room air at rest while awake.    *SpO2 improved to 93% on 2 liters/minute at rest.    *SpO2 = 89% on room air during activity/with exercise.    *SpO2 improved to 93% on 2 liters/minute during activity/with exercise.

## 2018-08-07 NOTE — PLAN OF CARE
Problem: Pain, Acute (Adult)  Goal: Identify Related Risk Factors and Signs and Symptoms  Related risk factors and signs and symptoms are identified upon initiation of Human Response Clinical Practice Guideline (CPG).   Outcome: No Change  Neuro: A&Ox4.   Cardiac: tmax 100.8- tylenol given temp down to 99.0 VSS.   Respiratory: Sating 100% on RA.  GI/: not saving  Diet/appetite: NPO denies nausea  Activity:  UP with SBA  Pain: At acceptable level on current regimen. Reports pain in right lower abdomin- declines pain meds.   Skin: No new deficits noted.  LDA's: PIV infusing.     Plan: IR to have drain placed this evening

## 2018-08-07 NOTE — PROGRESS NOTES
Interventional Radiology Pre-Procedure Sedation Assessment   Time of Assessment: 6:26 PM    Expected Level: Moderate Sedation    Indication: Sedation is required for the following type of Procedure: Biliary    Sedation and procedural consent: Risks, benefits and alternatives were discussed with Patient    PO Intake: Appropriately NPO for procedure    ASA Class: Class 2 - MILD SYSTEMIC DISEASE, NO ACUTE PROBLEMS, NO FUNCTIONAL LIMITATIONS.    Mallampati: Grade 2:  Soft palate, base of uvula, tonsillar pillars, and portion of posterior pharyngeal wall visible    Lungs: Lungs Clear with good breath sounds bilaterally    Heart: Normal heart sounds and rate    History and physical reviewed and no updates needed. I have reviewed the lab findings, diagnostic data, medications, and the plan for sedation. I have determined this patient to be an appropriate candidate for the planned sedation and procedure and have reassessed the patient IMMEDIATELY PRIOR to sedation and procedure.    Diony Campo MD

## 2018-08-07 NOTE — PHARMACY-ADMISSION MEDICATION HISTORY
Admission medication history interview status for the 8/7/2018 admission is complete. See Epic admission navigator for allergy information, pharmacy, prior to admission medications and immunization status.     Medication history interview sources:  patient, care everywhere and pharmacy ([phone])    Changes made to PTA medication list (reason)  Added: Excedrin PRN for pain/headache per patient   Deleted: Percocet 5-325 prn - patient denies taking   Changed: Brimonidine 0.1% 1 gtt every day --> 1 gtt bid per pharmacy     Additional medication history information (including reliability of information, actions taken by pharmacist):   - Confirmed allergies as documented (NKDA) with patient.   - Confirmed with patient direction for Combivent inhaler as PRN.    - Patient reports using both of his eye drops once every 3rd/4th day due to cost of the medications.    - Pharmacy confirmed patient picking up brimonidine 0.1% drop in 4/18 but he hasn't gotten his bimatoprost 0.01% in a while.   - Patient and pharmacy confirmed he has no other chronic medications besides those documented below.       Prior to Admission medications    Medication Sig Last Dose Taking? Auth Provider   acetaminophen-caffeine (EXCEDRIN TENSION HEADACHE) 500-65 MG TABS Take 2 tablets by mouth every 6 hours as needed for mild pain Past Week at PRN Yes Unknown, Entered By History   bimatoprost (LUMIGAN) 0.01 % SOLN Place 1 drop into both eyes At Bedtime  Past Week at Unknown time Yes Reported, Patient   brimonidine (ALPHAGAN P) 0.1 % ophthalmic solution Place 1 drop into both eyes 2 times daily  Past Week at Unknown time Yes Reported, Patient   clotrimazole-betamethasone (LOTRISONE) cream Apply topically 2 times daily as needed Past Week at prn Yes Reported, Patient   Ipratropium-Albuterol (COMBIVENT RESPIMAT)  MCG/ACT inhaler Inhale 1 puff into the lungs daily as needed Past Week at prn Yes Reported, Patient         Medication  history completed by: Velvet Travis PD4 Student on 8/7/2018 at 11:34 AM

## 2018-08-07 NOTE — PLAN OF CARE
Problem: Patient Care Overview  Goal: Plan of Care/Patient Progress Review  Pt admitted from OSH for further management of sub-hepatic abscess/infection. Afebrile. VSS, on RA. Denies pain and nausea. PIV infusing MIVF @ 75ml/hr. On IV zosyn Q6hrs. SBA, steady on feet. Voiding spontaneously via bathroom. No BM this shift, passing gas. NPO for possible IR evaluation. General surgery following and will consult in AM with pt. A/Ox3, calling appropriately and making needs known. Will continue to monitor and follow POC.

## 2018-08-08 LAB
ALBUMIN SERPL-MCNC: 2.3 G/DL (ref 3.4–5)
ALP SERPL-CCNC: 85 U/L (ref 40–150)
ALT SERPL W P-5'-P-CCNC: 17 U/L (ref 0–70)
ANION GAP SERPL CALCULATED.3IONS-SCNC: 3 MMOL/L (ref 3–14)
AST SERPL W P-5'-P-CCNC: 11 U/L (ref 0–45)
BASOPHILS # BLD AUTO: 0 10E9/L (ref 0–0.2)
BASOPHILS NFR BLD AUTO: 0.1 %
BILIRUB SERPL-MCNC: 0.7 MG/DL (ref 0.2–1.3)
BUN SERPL-MCNC: 14 MG/DL (ref 7–30)
CALCIUM SERPL-MCNC: 8 MG/DL (ref 8.5–10.1)
CHLORIDE SERPL-SCNC: 106 MMOL/L (ref 94–109)
CO2 SERPL-SCNC: 28 MMOL/L (ref 20–32)
CREAT SERPL-MCNC: 0.7 MG/DL (ref 0.66–1.25)
DIFFERENTIAL METHOD BLD: ABNORMAL
EOSINOPHIL # BLD AUTO: 0.1 10E9/L (ref 0–0.7)
EOSINOPHIL NFR BLD AUTO: 1.2 %
ERYTHROCYTE [DISTWIDTH] IN BLOOD BY AUTOMATED COUNT: 14.9 % (ref 10–15)
GFR SERPL CREATININE-BSD FRML MDRD: >90 ML/MIN/1.7M2
GLUCOSE SERPL-MCNC: 125 MG/DL (ref 70–99)
GRAM STN SPEC: ABNORMAL
GRAM STN SPEC: ABNORMAL
HCT VFR BLD AUTO: 35.9 % (ref 40–53)
HGB BLD-MCNC: 11.7 G/DL (ref 13.3–17.7)
IMM GRANULOCYTES # BLD: 0 10E9/L (ref 0–0.4)
IMM GRANULOCYTES NFR BLD: 0.2 %
LYMPHOCYTES # BLD AUTO: 0.9 10E9/L (ref 0.8–5.3)
LYMPHOCYTES NFR BLD AUTO: 10.1 %
MAGNESIUM SERPL-MCNC: 2.1 MG/DL (ref 1.6–2.3)
MCH RBC QN AUTO: 29.3 PG (ref 26.5–33)
MCHC RBC AUTO-ENTMCNC: 32.6 G/DL (ref 31.5–36.5)
MCV RBC AUTO: 90 FL (ref 78–100)
MONOCYTES # BLD AUTO: 0.9 10E9/L (ref 0–1.3)
MONOCYTES NFR BLD AUTO: 9.7 %
NEUTROPHILS # BLD AUTO: 7 10E9/L (ref 1.6–8.3)
NEUTROPHILS NFR BLD AUTO: 78.7 %
NRBC # BLD AUTO: 0 10*3/UL
NRBC BLD AUTO-RTO: 0 /100
PHOSPHATE SERPL-MCNC: 2.4 MG/DL (ref 2.5–4.5)
PLATELET # BLD AUTO: 133 10E9/L (ref 150–450)
POTASSIUM SERPL-SCNC: 3.4 MMOL/L (ref 3.4–5.3)
PROT SERPL-MCNC: 6.1 G/DL (ref 6.8–8.8)
RBC # BLD AUTO: 4 10E12/L (ref 4.4–5.9)
SODIUM SERPL-SCNC: 137 MMOL/L (ref 133–144)
SPECIMEN SOURCE: ABNORMAL
WBC # BLD AUTO: 8.9 10E9/L (ref 4–11)

## 2018-08-08 PROCEDURE — 12000001 ZZH R&B MED SURG/OB UMMC

## 2018-08-08 PROCEDURE — 25000132 ZZH RX MED GY IP 250 OP 250 PS 637: Performed by: STUDENT IN AN ORGANIZED HEALTH CARE EDUCATION/TRAINING PROGRAM

## 2018-08-08 PROCEDURE — 80053 COMPREHEN METABOLIC PANEL: CPT | Performed by: FAMILY MEDICINE

## 2018-08-08 PROCEDURE — 83735 ASSAY OF MAGNESIUM: CPT | Performed by: FAMILY MEDICINE

## 2018-08-08 PROCEDURE — 84100 ASSAY OF PHOSPHORUS: CPT | Performed by: FAMILY MEDICINE

## 2018-08-08 PROCEDURE — 25000128 H RX IP 250 OP 636: Performed by: FAMILY MEDICINE

## 2018-08-08 PROCEDURE — 85025 COMPLETE CBC W/AUTO DIFF WBC: CPT | Performed by: FAMILY MEDICINE

## 2018-08-08 PROCEDURE — 25000125 ZZHC RX 250: Performed by: STUDENT IN AN ORGANIZED HEALTH CARE EDUCATION/TRAINING PROGRAM

## 2018-08-08 PROCEDURE — 25000128 H RX IP 250 OP 636: Performed by: STUDENT IN AN ORGANIZED HEALTH CARE EDUCATION/TRAINING PROGRAM

## 2018-08-08 PROCEDURE — 36415 COLL VENOUS BLD VENIPUNCTURE: CPT | Performed by: FAMILY MEDICINE

## 2018-08-08 RX ORDER — POTASSIUM CHLORIDE 750 MG/1
20-40 TABLET, EXTENDED RELEASE ORAL
Status: DISCONTINUED | OUTPATIENT
Start: 2018-08-08 | End: 2018-08-09 | Stop reason: HOSPADM

## 2018-08-08 RX ORDER — AMOXICILLIN 250 MG
1 CAPSULE ORAL 2 TIMES DAILY
Status: DISCONTINUED | OUTPATIENT
Start: 2018-08-08 | End: 2018-08-09 | Stop reason: HOSPADM

## 2018-08-08 RX ORDER — POTASSIUM CL/LIDO/0.9 % NACL 10MEQ/0.1L
10 INTRAVENOUS SOLUTION, PIGGYBACK (ML) INTRAVENOUS
Status: DISCONTINUED | OUTPATIENT
Start: 2018-08-08 | End: 2018-08-08

## 2018-08-08 RX ORDER — POTASSIUM CHLORIDE 7.45 MG/ML
10 INJECTION INTRAVENOUS
Status: DISCONTINUED | OUTPATIENT
Start: 2018-08-08 | End: 2018-08-09 | Stop reason: HOSPADM

## 2018-08-08 RX ORDER — POTASSIUM CHLORIDE 1.5 G/1.58G
20-40 POWDER, FOR SOLUTION ORAL
Status: DISCONTINUED | OUTPATIENT
Start: 2018-08-08 | End: 2018-08-09 | Stop reason: HOSPADM

## 2018-08-08 RX ORDER — OXYCODONE HYDROCHLORIDE 5 MG/1
5 TABLET ORAL EVERY 6 HOURS PRN
Status: DISCONTINUED | OUTPATIENT
Start: 2018-08-08 | End: 2018-08-09 | Stop reason: HOSPADM

## 2018-08-08 RX ORDER — SODIUM CHLORIDE 9 MG/ML
INJECTION, SOLUTION INTRAVENOUS CONTINUOUS
Status: DISCONTINUED | OUTPATIENT
Start: 2018-08-08 | End: 2018-08-08

## 2018-08-08 RX ORDER — POTASSIUM CHLORIDE 29.8 MG/ML
20 INJECTION INTRAVENOUS
Status: DISCONTINUED | OUTPATIENT
Start: 2018-08-08 | End: 2018-08-09 | Stop reason: HOSPADM

## 2018-08-08 RX ORDER — MAGNESIUM SULFATE HEPTAHYDRATE 40 MG/ML
4 INJECTION, SOLUTION INTRAVENOUS EVERY 4 HOURS PRN
Status: DISCONTINUED | OUTPATIENT
Start: 2018-08-08 | End: 2018-08-09 | Stop reason: HOSPADM

## 2018-08-08 RX ADMIN — ACETAMINOPHEN 650 MG: 325 TABLET, FILM COATED ORAL at 01:34

## 2018-08-08 RX ADMIN — PIPERACILLIN SODIUM AND TAZOBACTAM SODIUM 3.38 G: 3; .375 INJECTION, POWDER, LYOPHILIZED, FOR SOLUTION INTRAVENOUS at 06:13

## 2018-08-08 RX ADMIN — SODIUM CHLORIDE 500 ML: 9 INJECTION, SOLUTION INTRAVENOUS at 02:37

## 2018-08-08 RX ADMIN — BRIMONIDINE TARTRATE 1 DROP: 2 SOLUTION/ DROPS OPHTHALMIC at 08:13

## 2018-08-08 RX ADMIN — OXYCODONE HYDROCHLORIDE 5 MG: 5 TABLET ORAL at 20:29

## 2018-08-08 RX ADMIN — IBUPROFEN 600 MG: 200 TABLET, FILM COATED ORAL at 08:11

## 2018-08-08 RX ADMIN — PIPERACILLIN SODIUM AND TAZOBACTAM SODIUM 3.38 G: 3; .375 INJECTION, POWDER, LYOPHILIZED, FOR SOLUTION INTRAVENOUS at 01:30

## 2018-08-08 RX ADMIN — UMECLIDINIUM BROMIDE AND VILANTEROL TRIFENATATE 1 PUFF: 62.5; 25 POWDER RESPIRATORY (INHALATION) at 09:17

## 2018-08-08 RX ADMIN — SODIUM CHLORIDE 500 ML: 9 INJECTION, SOLUTION INTRAVENOUS at 06:46

## 2018-08-08 RX ADMIN — PIPERACILLIN SODIUM AND TAZOBACTAM SODIUM 3.38 G: 3; .375 INJECTION, POWDER, LYOPHILIZED, FOR SOLUTION INTRAVENOUS at 12:03

## 2018-08-08 RX ADMIN — PIPERACILLIN SODIUM AND TAZOBACTAM SODIUM 3.38 G: 3; .375 INJECTION, POWDER, LYOPHILIZED, FOR SOLUTION INTRAVENOUS at 18:16

## 2018-08-08 RX ADMIN — POTASSIUM PHOSPHATE, MONOBASIC AND POTASSIUM PHOSPHATE, DIBASIC 15 MMOL: 224; 236 INJECTION, SOLUTION INTRAVENOUS at 14:07

## 2018-08-08 RX ADMIN — SODIUM CHLORIDE: 9 INJECTION, SOLUTION INTRAVENOUS at 03:36

## 2018-08-08 RX ADMIN — BIMATOPROST 1 DROP: 0.1 SOLUTION/ DROPS OPHTHALMIC at 08:13

## 2018-08-08 RX ADMIN — OXYCODONE HYDROCHLORIDE 5 MG: 5 TABLET ORAL at 12:03

## 2018-08-08 ASSESSMENT — ACTIVITIES OF DAILY LIVING (ADL)
ADLS_ACUITY_SCORE: 9

## 2018-08-08 NOTE — PROGRESS NOTES
Surgery Progress Note  8/8/2018     Subjective:  - IR yesterday for drain; tolerated procedure well. Moderate brown/bilious output.   - Minimal urine output since yesterday. The patient says that he has been drinking a lot of water this morning and was waiting for breakfast.  - This morning the patient has increased RLQ pain and feels more bloated than normal. He also wonders if he could have stronger medications to control his pain and says that in the past he has tolerated narcotic pain medications well.  He specifically denies shortness of breath or nausea. He has been using his incentive spirometer frequently, as much as 10 puffs twice per hour during the day.     Objective:  Temp:  [96.2  F (35.7  C)-100.6  F (38.1  C)] 97.6  F (36.4  C)  Pulse:  [68-82] 74  Heart Rate:  [72-78] 78  Resp:  [16-26] 18  BP: (105-132)/(55-72) 115/55  SpO2:  [92 %-100 %] 97 %  I/O last 3 completed shifts:  In: 350 [P.O.:240; I.V.:100; Other:10]  Out: 1300 [Urine:1200; Drains:100]    Gen: Awake, alert, NAD   Resp: NLB on room air  Abd: Soft. Mild distension with RLQ tenderness. R abdomen drain with brown fluid. Several old surgical scars in the upper abdomen.  Ext: WWP  : Urine in bedside urinal appears very concentrated  Dressing/Incision: C/d/i w/o erythema/drainage    BMP  Recent Labs  Lab 08/08/18  0257 08/07/18  0611    137   POTASSIUM 3.4 3.7   CHLORIDE 106 106   JEFFRY 8.0* 8.4*   CO2 28 23   BUN 14 13   CR 0.70 0.68   * 100*   MAG 2.1  --    PHOS 2.4*  --      CBC  Recent Labs  Lab 08/08/18  0257 08/07/18  0611   WBC 8.9 10.6   RBC 4.00* 4.26*   HGB 11.7* 12.4*   HCT 35.9* 37.7*   MCV 90 89   MCH 29.3 29.1   MCHC 32.6 32.9   RDW 14.9 15.1*   * 128*     INRNo lab results found in last 7 days.   AST/ALT & Alk Phos  Recent Labs  Lab 08/08/18  0257   AST 11   ALT 17   ALKPHOS 85     Bili  Recent Labs   Lab Test  08/08/18   0257  07/09/18   0717  07/08/18   0729  07/07/18   1114  07/06/18   0756   07/05/18    0659   BILITOTAL  0.7  0.6  0.5  0.5  0.7   < >  1.0   DBIL   --    --    --    --   0.2   --   0.1    < > = values in this interval not displayed.     Lipase/AmlyaseNo lab results found in last 7 days.    A/P: Abrahan Hagen is a 72 year old male with history of congenital esophageal atresia s/p colonic interposition with recent admission for medically managed acute cholecystitis who p/w extreme fatigue, dehydration, fever, and CT showing sub-hepatic fluid collection. S/P drain insertion with IR on 8/7.    - Low fat diet  - Encourage PO fluids, received 2 500mL boluses o/n (second bolus running currently)  - Recommend additional pain medication (will defer to primary team)  - F/U cultures      Seen w/ chief resident Dr. Yasmin De Souza    This note was written with the assistance of Maria Luz Cerda, MS4.    Jackie Mar MD  PGY-1 Surgery  Pager [phone]

## 2018-08-08 NOTE — PLAN OF CARE
Problem: Patient Care Overview  Goal: Plan of Care/Patient Progress Review  Outcome: No Change  Received from IR art 1915 per cart accompanied by transport staff s/p biliary drain placement. Alert and oriented x 4. Verbalized pain over drain site with relief from prn pain med. Tolerating low fat diet. R lateral abdomen drain site irrigated as ordered. Referred for poor urine output. Post void bladder scan is 12 ml. Denies bladder distention. 500 cc IV bolus given. STAT labs ordered. Vital signs stable. Will continue to monitor and follow plan of care

## 2018-08-08 NOTE — PROCEDURES
Interventional Radiology Brief Post Procedure Note    Procedure: IR BILIARY DRAIN PLACEMENT    Proceduralist: Diony Campo MD    Assistant: Radiology Resident Physician, Macho Christensen DO    Time Out: Prior to the start of the procedure and with procedural staff participation, I verbally confirmed the patient s identity using two indicators, relevant allergies, that the procedure was appropriate and matched the consent or emergent situation, and that the correct equipment/implants were available. Immediately prior to starting the procedure I conducted the Time Out with the procedural staff and re-confirmed the patient s name, procedure, and site/side. (The Joint Commission universal protocol was followed.)  Yes    Medications   Medication Event Details Admin User Admin Time       Sedation: IR Nurse Monitored Care   Post Procedure Summary:  Prior to the start of the procedure and with procedural staff participation, I verbally confirmed the patient s identity using two indicators, relevant allergies, that the procedure was appropriate and matched the consent or emergent situation, and that the correct equipment/implants were available. Immediately prior to starting the procedure I conducted the Time Out with the procedural staff and re-confirmed the patient s name, procedure, and site/side. (The Joint Commission universal protocol was followed.)  Yes       Sedatives: Fentanyl and Midazolam (Versed)    Vital signs, airway and pulse oximetry were monitored and remained stable throughout the procedure and sedation was maintained until the procedure was complete.  The patient was monitored by staff until sedation discharge criteria were met.    Patient tolerance: Patient tolerated the procedure well with no immediate complications.    Time of sedation in minutes: 15 Minutes minutes from beginning to end of physician one to one monitoring.          Findings: Distended gallbladder with significant GB wall  thickening and pericholecystic fluid. Transhepatic approach placement of 8f locking skater drainage catheter placed under US and Fluoroscopic guidance. 120cc of purulent fluid aspirated and sent to lab for further evaluation.      Estimated Blood Loss: Minimal    Fluoroscopy Time:  minute(s)    SPECIMENS: Fluid and/or tissue for Gram stain and culture    Complications: 1. None     Condition: Stable    Plan: Patient to return to [facility]. 10cc saline flushes q8hrs. Drain to gravity.     Comments: See dictated procedure note for full details.    Macho Christensen DO

## 2018-08-08 NOTE — PROGRESS NOTES
August 7, 2018  8:13 PM    [facility] service    Post procedure check note    Subjective:  Patient is status post transhepatic fluoroscopically guided biliary drain placement earlier this evening.  He tolerated the procedure well without complications.  Called by nurse notified me that he had returned to the floor and asking for a diet order.  The patient said that he has some mild tenderness at the site of the skin incision where the drain exits.  Otherwise he is feeling well.  He denies any nausea vomiting.  He does say he has not had a bowel movement in a couple of days.  He is hungry and would like something to eat this evening.  He denies any fevers and chills.  He has not been out of bed since the procedure.  He has not voided since the procedure but did void just prior to the procedure.    Objective: /60 (BP Location: Left arm)  Pulse 68  Temp 96.2  F (35.7  C) (Oral)  Resp 18  Wt 71.2 kg (157 lb)  SpO2 92%  BMI 23.87 kg/m2    General: Alert and oriented, in no acute distress.  Skin: Warm and dry, no abnormalities noted.  Eyes: Extra-ocular muscles intact, pupils equal and reactive.  ENT: Speech intact, nasal passages open, no hearing impairment noted  Neck: Supple, no swelling noted  Respiratory/Chest: No respiratory difficulty noted.  Slightly decreased bilateral bases bilaterally. Colonic conduit present under the skin on the left anterior chest wall.  No overlying skin changes  CVS: S1S2 present  GI: Soft, multiple healed scars.  Firm but not rigid, very mild RUQ tend [place] drain in right upper quadrant is drainingerness at site of biliary drain.  Reddish/tess clear fluid, nonpurulent.  Approximately 15 cc presents in tube and bag.  No charted drain output otherwise except for 120 cc of purulent fluid which was aspirated during the procedure.  Dressing around biliary drain is clean and dry and well adherent.  No bleeding  Neuro: Gait and station normal, comprehension intact.  Gross and fine motor skills intact.   Psychiatric: Mood and affect appear normal.   Extremities: Warm      A/P  72-year-old male with acute cholecystitis complicated by gallbladder rupture with associated abscess/phlegmon status post IR biliary drainage, doing well post procedure.    -Continue IV Zosyn  -Biliary drain to gravity, flush with 10 mL's sterile saline every 8 hours  -Drain care/strain education  -Acetaminophen and ibuprofen for pain control  -Resume low-fat diet, IV lock maintenance fluids  -Void check  -Out of bed and ambulating  -Incentive spirometry 10 times per hour while awake  -Surgery, gastroenterology, and interventional radiology following      Benjy Woodward MD  Chief Resident  [facility]  [phone]

## 2018-08-08 NOTE — PROVIDER NOTIFICATION
Dr. Woodward informed regarding poor urine output post IR procedure. IV bolus and stat labs ordered

## 2018-08-08 NOTE — PROGRESS NOTES
[facility] Progress Note - [facility]       Main Plans for Today   Cont IV abx  Discuss drain with IR    Assessment & Plan   Abrahan Hagen is a 72 year old male admitted on 8/7/2018. He has a history of esophageal atresia s/p multiple abdominal surgeries including colonic interposition, GERD, chronic ILD and glaucoma and is admitted for phlegmon/sub-hepatic abscess.     # Fatigue  # Weakness  # Fever  # Sub-hepatic abscess/phlegmon  Patient transferred from [facility] for further evaluation and management of sub-hepatic abscess found on CT. Patient is HD stable and afebrile on presentation with no outstanding findings on physical exam nor acute pain. Labs at OSH were remarkable for a leukocytosis of 12.2. Patient is now s/p transhepatic biliary drain placement on 8/7    -F/u cultures from aspirate  - Continue Zosyn 4.5 mg Q6H   - Pain management: tylenol and ibuprofen   - dc IVF  - Blood cultures pending  - Low fat diet  -Surgery considering outpatient cholecystectomy following resolution of infection  - Trend CBC      Chronic medical conditions:  #Glaucoma: continue PTA eye drops     # Chronic interstitial lung disease: continue PTA Combivent PRN     # Pain Assessment:  Current Pain Score 8/8/2018   Patient currently in pain? yes   Pain score (0-10) -   Pain location Abdomen   Pain descriptors Sharp   - Abrahan is experiencing pain due to Cholecystitis. Pain management was discussed and the plan was created in a collaborative fashion.  Abrahan's response to the current recommendations: engaged  - Pharmacologic adjuvants: Acetaminophen        Diet: Low Fat Diet  Fluids: PO  DVT Prophylaxis: Pneumatic Compression Devices  Code Status: DNR / DNI    Disposition Plan   Expected discharge: 4 - 7 days, recommended to prior living arrangement once surgery consult, treatment for abscess/phlegmon. Dispo: Expected Discharge Date: 08/10/18        Entered: Christopher HENDERSON  Shahid 08/08/2018, 6:45 AM   Information in the above section will display in the discharge planner report.      The patient's care was discussed with the Attending Physician, Dr. Judd.    Christopher Key  [facility] Medicine  Pager: [phone]  Please see sticky note for cross cover information    Interval History   Patient is doing well. Had a bowel movement this AM, which helped his abdominal discomfort. No further fevers. No nausea or vomiting.    Physical Exam   Vital Signs: Temp: 97.6  F (36.4  C) Temp src: Oral BP: 115/55 Pulse: 74 Heart Rate: 78 Resp: 18 SpO2: 97 % O2 Device: None (Room air)    Weight: 157 lbs 0 oz    Constitutional: He is oriented to person, place, and time. He appears well-developed and well-nourished. No distress.     Cardiovascular: Normal rate, regular rhythm and intact distal pulses.  Exam reveals no gallop and no friction rub.    Murmur (systolic) heard.  Pulmonary/Chest: Effort normal and breath sounds normal. No respiratory distress. He has no wheezes. He has no rales.   Suprasternal colonic conduit palpable with air.    Abdominal: Soft. Bowel sounds are normal. He exhibits no distension. There is no tenderness. There is no rebound and no guarding.   Multiple well heeled abdominal incisions   Drain- draining blood tinged bile  Neurological: He is alert and oriented to person, place, and time.   Skin: Skin is warm and dry.   Psychiatric: He has a normal mood and affect. Thought content normal.     Data

## 2018-08-08 NOTE — CONSULTS
Abrahan was seen at the bedside for some instructions on his drainage system and flushing and site cares. He needed the information repeated many times and appeared to have some recall issues with flushing and the steps with using the drain and flushing despite using the written sheets for cares. I asked him to work with the nurses on the unit to do his own flushing from this point on to become more familiar with the steps. I also talked to Odalys about making sure that the Pt.does the cares himself with staff supervision. He was given all the written material to use for the cares.

## 2018-08-08 NOTE — PLAN OF CARE
AVSS. Ibuprofen x1 and oxycodone 5mg x1 for R abdominal pain. Patient received an NS bolus overnight and again early this morning, drinking plenty of fluids. Urine output now lighter tess and good amounts. Post-void residuals this morning <200mL. Appetite good. Ambulated in halls. PLC teaching performed at bedside for sub-hepatic drain. Patient needs reinforcement, should flush line and empty drain with supervision as ordered q8hrs. Phos level this morning 2.4, KPhos 15mMol infusing.

## 2018-08-09 VITALS
OXYGEN SATURATION: 95 % | TEMPERATURE: 98.5 F | SYSTOLIC BLOOD PRESSURE: 133 MMHG | WEIGHT: 163 LBS | RESPIRATION RATE: 16 BRPM | HEART RATE: 82 BPM | BODY MASS INDEX: 24.78 KG/M2 | DIASTOLIC BLOOD PRESSURE: 77 MMHG

## 2018-08-09 LAB
ALBUMIN SERPL-MCNC: 2.5 G/DL (ref 3.4–5)
ALP SERPL-CCNC: 90 U/L (ref 40–150)
ALT SERPL W P-5'-P-CCNC: 13 U/L (ref 0–70)
ANION GAP SERPL CALCULATED.3IONS-SCNC: 8 MMOL/L (ref 3–14)
AST SERPL W P-5'-P-CCNC: 11 U/L (ref 0–45)
BACTERIA SPEC CULT: ABNORMAL
BASOPHILS # BLD AUTO: 0 10E9/L (ref 0–0.2)
BASOPHILS NFR BLD AUTO: 0.1 %
BILIRUB SERPL-MCNC: 0.6 MG/DL (ref 0.2–1.3)
BUN SERPL-MCNC: 9 MG/DL (ref 7–30)
CALCIUM SERPL-MCNC: 8.6 MG/DL (ref 8.5–10.1)
CHLORIDE SERPL-SCNC: 105 MMOL/L (ref 94–109)
CO2 SERPL-SCNC: 25 MMOL/L (ref 20–32)
CREAT SERPL-MCNC: 0.65 MG/DL (ref 0.66–1.25)
DIFFERENTIAL METHOD BLD: ABNORMAL
EOSINOPHIL # BLD AUTO: 0.3 10E9/L (ref 0–0.7)
EOSINOPHIL NFR BLD AUTO: 3.2 %
ERYTHROCYTE [DISTWIDTH] IN BLOOD BY AUTOMATED COUNT: 14.8 % (ref 10–15)
GFR SERPL CREATININE-BSD FRML MDRD: >90 ML/MIN/1.7M2
GLUCOSE SERPL-MCNC: 96 MG/DL (ref 70–99)
HCT VFR BLD AUTO: 35.4 % (ref 40–53)
HGB BLD-MCNC: 11.7 G/DL (ref 13.3–17.7)
IMM GRANULOCYTES # BLD: 0 10E9/L (ref 0–0.4)
IMM GRANULOCYTES NFR BLD: 0.4 %
LYMPHOCYTES # BLD AUTO: 1 10E9/L (ref 0.8–5.3)
LYMPHOCYTES NFR BLD AUTO: 12 %
MCH RBC QN AUTO: 29.3 PG (ref 26.5–33)
MCHC RBC AUTO-ENTMCNC: 33.1 G/DL (ref 31.5–36.5)
MCV RBC AUTO: 89 FL (ref 78–100)
MONOCYTES # BLD AUTO: 1.3 10E9/L (ref 0–1.3)
MONOCYTES NFR BLD AUTO: 15.9 %
NEUTROPHILS # BLD AUTO: 5.6 10E9/L (ref 1.6–8.3)
NEUTROPHILS NFR BLD AUTO: 68.4 %
NRBC # BLD AUTO: 0 10*3/UL
NRBC BLD AUTO-RTO: 0 /100
PLATELET # BLD AUTO: 173 10E9/L (ref 150–450)
POTASSIUM SERPL-SCNC: 3.3 MMOL/L (ref 3.4–5.3)
POTASSIUM SERPL-SCNC: 4.2 MMOL/L (ref 3.4–5.3)
PROT SERPL-MCNC: 6.7 G/DL (ref 6.8–8.8)
RBC # BLD AUTO: 3.99 10E12/L (ref 4.4–5.9)
SODIUM SERPL-SCNC: 138 MMOL/L (ref 133–144)
SPECIMEN SOURCE: ABNORMAL
WBC # BLD AUTO: 8.2 10E9/L (ref 4–11)

## 2018-08-09 PROCEDURE — 40000556 ZZH STATISTIC PERIPHERAL IV START W US GUIDANCE

## 2018-08-09 PROCEDURE — 36415 COLL VENOUS BLD VENIPUNCTURE: CPT | Performed by: FAMILY MEDICINE

## 2018-08-09 PROCEDURE — 25000128 H RX IP 250 OP 636: Performed by: STUDENT IN AN ORGANIZED HEALTH CARE EDUCATION/TRAINING PROGRAM

## 2018-08-09 PROCEDURE — 36415 COLL VENOUS BLD VENIPUNCTURE: CPT | Performed by: STUDENT IN AN ORGANIZED HEALTH CARE EDUCATION/TRAINING PROGRAM

## 2018-08-09 PROCEDURE — 85025 COMPLETE CBC W/AUTO DIFF WBC: CPT | Performed by: STUDENT IN AN ORGANIZED HEALTH CARE EDUCATION/TRAINING PROGRAM

## 2018-08-09 PROCEDURE — 25000132 ZZH RX MED GY IP 250 OP 250 PS 637: Performed by: STUDENT IN AN ORGANIZED HEALTH CARE EDUCATION/TRAINING PROGRAM

## 2018-08-09 PROCEDURE — 80053 COMPREHEN METABOLIC PANEL: CPT | Performed by: STUDENT IN AN ORGANIZED HEALTH CARE EDUCATION/TRAINING PROGRAM

## 2018-08-09 PROCEDURE — 84132 ASSAY OF SERUM POTASSIUM: CPT | Performed by: FAMILY MEDICINE

## 2018-08-09 PROCEDURE — 25000125 ZZHC RX 250: Performed by: STUDENT IN AN ORGANIZED HEALTH CARE EDUCATION/TRAINING PROGRAM

## 2018-08-09 RX ORDER — AMOXICILLIN 250 MG
1 CAPSULE ORAL 2 TIMES DAILY
Qty: 100 TABLET | Refills: 0 | Status: SHIPPED | OUTPATIENT
Start: 2018-08-09 | End: 2019-01-09

## 2018-08-09 RX ORDER — CEFDINIR 300 MG/1
300 CAPSULE ORAL ONCE
Status: COMPLETED | OUTPATIENT
Start: 2018-08-09 | End: 2018-08-09

## 2018-08-09 RX ORDER — OXYCODONE HYDROCHLORIDE 5 MG/1
5 TABLET ORAL 2 TIMES DAILY PRN
Qty: 10 TABLET | Refills: 0 | Status: SHIPPED | OUTPATIENT
Start: 2018-08-09 | End: 2018-08-14

## 2018-08-09 RX ORDER — CEFDINIR 300 MG/1
300 CAPSULE ORAL 2 TIMES DAILY
Qty: 14 CAPSULE | Refills: 0 | Status: SHIPPED | OUTPATIENT
Start: 2018-08-09 | End: 2018-08-16

## 2018-08-09 RX ORDER — LIDOCAINE 40 MG/G
CREAM TOPICAL
Status: DISCONTINUED | OUTPATIENT
Start: 2018-08-09 | End: 2018-08-09 | Stop reason: HOSPADM

## 2018-08-09 RX ADMIN — PIPERACILLIN SODIUM AND TAZOBACTAM SODIUM 3.38 G: 3; .375 INJECTION, POWDER, LYOPHILIZED, FOR SOLUTION INTRAVENOUS at 00:23

## 2018-08-09 RX ADMIN — PIPERACILLIN SODIUM AND TAZOBACTAM SODIUM 3.38 G: 3; .375 INJECTION, POWDER, LYOPHILIZED, FOR SOLUTION INTRAVENOUS at 06:38

## 2018-08-09 RX ADMIN — CEFDINIR 300 MG: 300 CAPSULE ORAL at 13:13

## 2018-08-09 RX ADMIN — BIMATOPROST 1 DROP: 0.1 SOLUTION/ DROPS OPHTHALMIC at 08:37

## 2018-08-09 RX ADMIN — POTASSIUM CHLORIDE 20 MEQ: 750 TABLET, EXTENDED RELEASE ORAL at 10:42

## 2018-08-09 RX ADMIN — BRIMONIDINE TARTRATE 1 DROP: 2 SOLUTION/ DROPS OPHTHALMIC at 08:37

## 2018-08-09 RX ADMIN — POTASSIUM PHOSPHATE, MONOBASIC AND POTASSIUM PHOSPHATE, DIBASIC 15 MMOL: 224; 236 INJECTION, SOLUTION INTRAVENOUS at 09:26

## 2018-08-09 RX ADMIN — IBUPROFEN 400 MG: 200 TABLET, FILM COATED ORAL at 01:02

## 2018-08-09 RX ADMIN — POTASSIUM CHLORIDE 40 MEQ: 750 TABLET, EXTENDED RELEASE ORAL at 08:38

## 2018-08-09 ASSESSMENT — ACTIVITIES OF DAILY LIVING (ADL)
ADLS_ACUITY_SCORE: 9

## 2018-08-09 NOTE — PLAN OF CARE
Problem: Patient Care Overview  Goal: Plan of Care/Patient Progress Review  Outcome: No Change  VSS on RA ex low grade temp, 100.2, 99.7 A&Ox4. Pt voiding independently . Regular, low fat diet. Pt pulled R hand PIV, New L forearm PIV during shift. Pain relieved by ibuprofen x1. E.coli detected in drain fluid.  IV Zosyn. Continue to reinforce and educate on irrigating biliary drain.  Possible discharge 8/10 home w/ drain.

## 2018-08-09 NOTE — PROGRESS NOTES
VSS. RA. A&O X4. Patient is SBA. Right bili drain unclamped and draining with no difficulties. Pt flushed drain with 10mL of saline. Reinforcement needed for using the stop cock correctly.  K 3.3 and Phos 2.4. Both replaced. Voiding independently, 1BM. Tolerating low-fat diet, no complaints of nausea. Switched to oral antibiotic. Left PIV saline locked. Plan to discharge this evening.

## 2018-08-09 NOTE — PROGRESS NOTES
Pt has order to dc home today. Pt is stable and agreeable to dc. Reviewed dc instructions and meds with pt and wife. Reviewed drain cares with pt and wife. Pt able to demonstrate good technique with irrigation. Pt verbalizes understanding with dc instructions, asking questions appropriately. Letter for return to work given to pt. All belongings and drain supplies packed and sent home with pt. Pt to f/u as discussed.

## 2018-08-09 NOTE — PLAN OF CARE
Problem: Patient Care Overview  Goal: Plan of Care/Patient Progress Review  Outcome: No Change  Pt AO. VSS on RA. Up IND. C/o Rt abd pain at drain site, managed with PRN Oxy. Tolerating PO. Voiding. Reports loose stool this evening. Rt bili drain intact, irrigated, pt able to demonstrate good technique with min assist. Receiving IV abx. Poss dc 8/10 per MD note.

## 2018-08-09 NOTE — PROGRESS NOTES
[facility], [facility] Progress Note - [facility]       Main Plans for Today   Switch to PO abx  Discuss drain with IR    Assessment & Plan   Abrahan Hagen is a 72 year old male admitted on 8/7/2018. He has a history of esophageal atresia s/p multiple abdominal surgeries including colonic interposition, GERD, chronic ILD and glaucoma and is admitted for phlegmon/sub-hepatic abscess.     # Fatigue  # Weakness  # Fever  # Sub-hepatic abscess/phlegmon  Patient transferred from [facility] for further evaluation and management of sub-hepatic abscess found on CT. Patient is HD stable and afebrile on presentation with no outstanding findings on physical exam nor acute pain. Labs at OSH were remarkable for a leukocytosis of 12.2. Patient is now s/p transhepatic biliary drain placement on 8/7    -F/u cultures from aspirate  - Continue Zosyn 4.5 mg Q6H, will discuss PO regimen with PharmD, likely use Cefdinir   - Pain management: tylenol and ibuprofen   - Fluid from aspirate growing E. Coli, quinolone resistant  - Blood cultures NGTD  - Low fat diet  -Surgery f.u 3-4 weeks as outpt  - Trend CBC      Chronic medical conditions:  #Glaucoma: continue PTA eye drops     # Chronic interstitial lung disease: continue PTA Combivent PRN     # Pain Assessment:  Current Pain Score 8/9/2018   Patient currently in pain? denies   Pain score (0-10) -   Pain location -   Pain descriptors -   - Abrahan is experiencing pain due to Cholecystitis. Pain management was discussed and the plan was created in a collaborative fashion.  Abrahan's response to the current recommendations: engaged  - Pharmacologic adjuvants: Acetaminophen        Diet: Low Fat Diet  Fluids: PO  DVT Prophylaxis: Pneumatic Compression Devices  Code Status: DNR / DNI    Disposition Plan   Expected discharge: tomorrow ( 8/10), recommended to prior living arrangement once surgery consult, treatment for abscess/phlegmon. Dispo:  Expected Discharge Date: 08/10/18        Entered: Christopher Key 08/09/2018, 9:37 AM   Information in the above section will display in the discharge planner report.      The patient's care was discussed with the Attending Physician, Dr. Judd.    Christopher Key  [facility]  Pager: [phone]  Please see sticky note for cross cover information    Interval History   No acute events overnight. Patient with well controlled pain. Has been ambulating around unit multiple times. Patient. No fevers or chills. No nausea or vomiting    Physical Exam   Vital Signs: Temp: 97.5  F (36.4  C) Temp src: Oral BP: 113/69 Pulse: 79   Resp: 16 SpO2: 97 % O2 Device: None (Room air)    Weight: 163 lbs 0 oz    Constitutional: He is oriented to person, place, and time. He appears well-developed and well-nourished. No distress.     Cardiovascular: Normal rate, regular rhythm and intact distal pulses.  Exam reveals no gallop and no friction rub.    Murmur (systolic) heard.  Pulmonary/Chest: Effort normal and breath sounds normal. No respiratory distress. He has no wheezes. He has no rales.   Suprasternal colonic conduit palpable with air.    Abdominal: Soft. Bowel sounds are normal. He exhibits no distension. There is no tenderness. There is no rebound and no guarding.   Multiple well heeled abdominal incisions   Drain- draining blood tinged bile  Neurological: He is alert and oriented to person, place, and time.   Skin: Skin is warm and dry.   Psychiatric: He has a normal mood and affect. Thought content normal.     Data

## 2018-08-09 NOTE — DISCHARGE SUMMARY
[facility], [facility] Discharge Summary- Kate  Service    Date of Admission:  8/7/2018  Date of Service: 8/9/2018  Date of Discharge:  8/9/2018  Discharging Attending Provider: Carlos Judd MD  Discharge Team: KathrynAlies    Discharge Diagnoses   Acute Cholecytitis  Subhepatic abscess  Fever secondary to above    Follow-ups Needed After Discharge   Follow-up with Dr. Robles ( general surgery) in 4 weeks for drain removal and to discuss cholecystectomy    Follow-up with PCP early next week for hospital follow-up, check CMP    Hospital Course   Abrahan Hagen is a 72 year old male admitted on 8/7/2018. He has a history of esophageal atresia s/p multiple abdominal surgeries including colonic interposition, GERD, chronic ILD and glaucoma and is admitted for phlegmon/sub-hepatic abscess.      # Fatigue  # Weakness  # Fever  # Sub-hepatic abscess/phlegmon  Patient transferred from [facility] for further evaluation and management of sub-hepatic abscess found on CT. Patient was determined to be good candidate for cholecystostomy tube placement, given higher surgical risk acutely. Patient underwent procedure on 8/7     -F/u cultures from aspirate  - Cefdinir 300mg BI D X 7 days  - Fluid from aspirate growing E. Coli, quinolone resistant  - F/U with General Surgery in 4 weeks for drain removal  - Check CMP next week with PCP  -Oxycodone 5mg take two times daily prn #10 for dc      # Discharge Pain Plan:   - During his hospitalization, Abrahan experienced pain due to post procedural.  The pain plan for discharge was discussed with Abrahan and the plan was created in a collaborative fashion.    - Opioids prescribed on discharge: Oxycodone 5mg #10  - Duration of opioids after discharge: 5 days      Consultations This Hospital Stay   SURGERY GENERAL ADULT IP CONSULT  INTERVENTIONAL RADIOLOGY ADULT/PEDS IP CONSULT  GI PANCREATICOBILIARY ADULT IP CONSULT  MEDICATION HISTORY IP PHARMACY  CONSULT  PATIENT LEARNING CENTER IP CONSULT  PATIENT LEARNING CENTER IP CONSULT  VASCULAR ACCESS CARE ADULT IP CONSULT     Code Status   Full Code       The patient was discussed with Dr. Binta Peralta's Family Medicine Inpatient Service  [facility]   Pager:[phone]_  ___________________________________________________________________  Review of Systems:  CONSTITUTIONAL: NEGATIVE for fever, chills, change in weight  EYES: NEGATIVE for vision changes or irritation  ENT/MOUTH: NEGATIVE for ear, mouth and throat problems  RESP: NEGATIVE for significant cough or SOB  CV: NEGATIVE for chest pain, palpitations or peripheral edema  GI: NEGATIVE mild abdominal pain, no nausea or vomiting or melena  : NEGATIVE for frequency, dysuria, or hematuria  MUSCULOSKELETAL: NEGATIVE for significant arthralgias or myalgia  NEURO: NEGATIVE for weakness, dizziness or paresthesias  ENDOCRINE: NEGATIVE for temperature intolerance, skin/hair changes  HEME/ALLERGY: NEGATIVE for bleeding problems  PSYCHIATRIC: NEGATIVE for changes in mood or affect  Physical Exam   Vital Signs: Temp: 98.5  F (36.9  C) Temp src: Oral BP: 133/77 Pulse: 82   Resp: 16 SpO2: 95 % O2 Device: None (Room air)    Weight: 163 lbs 0 oz    Constitutional: He is oriented to person, place, and time. He appears well-developed and well-nourished. No distress.      Cardiovascular: Normal rate, regular rhythm and intact distal pulses.  Exam reveals no gallop and no friction rub.    Murmur (systolic) heard.  Pulmonary/Chest: Effort normal and breath sounds normal. No respiratory distress. He has no wheezes. He has no rales.   Suprasternal colonic conduit palpable with air.    Abdominal: Soft. Bowel sounds are normal. He exhibits no distension. There is no tenderness. There is no rebound and no guarding.   Multiple well heeled abdominal incisions   Drain- draining blood tinged bile  Neurological: He is alert and oriented to person, place,  and time.   Skin: Skin is warm and dry.   Psychiatric: He has a normal mood and affect. Thought content normal.   Significant Results and Procedures       Pending Results   These results will be followed up by PCP  Unresulted Labs Ordered in the Past 30 Days of this Admission     Date and Time Order Name Status Description    8/7/2018 1905 Anaerobic bacterial culture Preliminary     8/7/2018 0617 Blood culture Preliminary     8/7/2018 0611 Blood culture Preliminary              Primary Care Physician   Ananda Jones    Discharge Disposition   Discharged to home  Condition at discharge: Stable    Discharge Orders     General Surg Adult Referral     Reason for your hospital stay   Gallbladder infection     Adult [facility] Follow-up and recommended labs and tests   Follow up with General surgery clinic at [facility] [place] [place] in 3-4 weeks to have drain removed and to discuss removing gallbladder    Appointments on [place] and/or [facility] (with [facility] or [facility] provider or service). Call [phone] if you haven't heard regarding these appointments within 7 days of discharge.     Follow Up and recommended labs and tests   Follow up with primary care provider, Ananda Jones, within 7 days for hospital follow- up.  The following labs/tests are recommended: CMP, CBC.     Activity   Your activity upon discharge: activity as tolerated     Tubes and drains   You are going home with the following tubes or drains: Biliary drain.  Tube cares per hospital or home care instructions     DNR/DNI     Diet   Follow this diet upon discharge: Orders Placed This Encounter     Low Fat Diet       Discharge Medications   Current Discharge Medication List      START taking these medications    Details   cefdinir (OMNICEF) 300 MG capsule Take 1 capsule (300 mg) by mouth 2 times daily for 7 days  Qty: 14 capsule, Refills: 0    Associated Diagnoses: Acute cholecystitis      oxyCODONE IR (ROXICODONE) 5 MG tablet Take 1 tablet (5 mg) by mouth 2 times  daily as needed for severe pain  Qty: 10 tablet, Refills: 0    Associated Diagnoses: Acute cholecystitis      senna-docusate (SENOKOT-S;PERICOLACE) 8.6-50 MG per tablet Take 1 tablet by mouth 2 times daily  Qty: 100 tablet, Refills: 0    Associated Diagnoses: Acute cholecystitis         CONTINUE these medications which have NOT CHANGED    Details   acetaminophen-caffeine (EXCEDRIN TENSION HEADACHE) 500-65 MG TABS Take 2 tablets by mouth every 6 hours as needed for mild pain      bimatoprost (LUMIGAN) 0.01 % SOLN Place 1 drop into both eyes At Bedtime       brimonidine (ALPHAGAN P) 0.1 % ophthalmic solution Place 1 drop into both eyes 2 times daily       clotrimazole-betamethasone (LOTRISONE) cream Apply topically 2 times daily as needed      Ipratropium-Albuterol (COMBIVENT RESPIMAT)  MCG/ACT inhaler Inhale 1 puff into the lungs daily as needed           Allergies   No Known Allergies

## 2018-08-09 NOTE — CONSULTS
Pt.had class on 8/82018. Nurses to reinforce flushing drain and site care with Pt. Class not needed at this time per RN on unit. If needs change please contact PLC.

## 2018-08-09 NOTE — PROGRESS NOTES
Surgery Progress Note  8/8/2018     Subjective:  NAEON. Feels well. Walking, taking in good PO. No nausea, vomiting, dizziness. Pain well controlled.     Objective:  Temp:  [96.2  F (35.7  C)-100.5  F (38.1  C)] 99.7  F (37.6  C)  Pulse:  [73-89] 83  Resp:  [16-18] 16  BP: (109-130)/(51-67) 122/67  SpO2:  [92 %-98 %] 93 %  I/O last 3 completed shifts:  In: 1670 [P.O.:1320; I.V.:330; Other:20]  Out: 2920 [Urine:2520; Drains:400]    Gen: Awake, alert, NAD   Resp: NLB on room air  Abd: Soft. Minimal, improving abdominal discomfort. Drain in place with bile output.  Several old surgical scars in the upper abdomen.  Ext: WWP  : No valencia   Dressing/Incision: C/d/i w/o erythema/drainage    BMP    Recent Labs  Lab 08/08/18 0257 08/07/18  0611    137   POTASSIUM 3.4 3.7   CHLORIDE 106 106   JEFFRY 8.0* 8.4*   CO2 28 23   BUN 14 13   CR 0.70 0.68   * 100*   MAG 2.1  --    PHOS 2.4*  --      CBC    Recent Labs  Lab 08/08/18 0257 08/07/18  0611   WBC 8.9 10.6   RBC 4.00* 4.26*   HGB 11.7* 12.4*   HCT 35.9* 37.7*   MCV 90 89   MCH 29.3 29.1   MCHC 32.6 32.9   RDW 14.9 15.1*   * 128*     INRNo lab results found in last 7 days.   AST/ALT & Alk Phos    Recent Labs  Lab 08/08/18  0257   AST 11   ALT 17   ALKPHOS 85     Bili  Recent Labs   Lab Test  08/08/18   0257  07/09/18   0717  07/08/18   0729  07/07/18   1114  07/06/18   0756   07/05/18   0659   BILITOTAL  0.7  0.6  0.5  0.5  0.7   < >  1.0   DBIL   --    --    --    --   0.2   --   0.1    < > = values in this interval not displayed.     Lipase/AmlyaseNo lab results found in last 7 days.    A/P: Abrahan Hagen is a 72 year old male with history of congenital esophageal atresia s/p colonic interposition with recent admission for medically managed acute cholecystitis who p/w extreme fatigue, dehydration, fever, and CT showing sub-hepatic fluid collection. S/P drain insertion with IR on 8/7. Drain is putting out copious bilious output- likely draining the GB  directly.     - Low fat diet  - Follow up with Dr. Robles in clinic in 4 weeks for discussion re: cholecystectomy.   - Bowel reg  - Surgery will continue to follow peripherally.     Seen w/ chief resident Dr. Yasmin De Souza    This note was written with the assistance of Maria Luz Cerda, MS4.     I have seen the patient with the medical student and have edited and agree with above note.     Halley Jett MD  PGY1 Surgery

## 2018-08-10 ENCOUNTER — TELEPHONE (OUTPATIENT)
Dept: SURGERY | Facility: CLINIC | Age: 73
End: 2018-08-10

## 2018-08-10 ENCOUNTER — CARE COORDINATION (OUTPATIENT)
Dept: CARE COORDINATION | Facility: CLINIC | Age: 73
End: 2018-08-10

## 2018-08-10 NOTE — TELEPHONE ENCOUNTER
Per task, this  called the patient and scheduled an appointment for him to see Dr. Reardon on 09/12/2018 at 8:00 am. He let this  know that he has been having issues with his phone. He knows that he can call [phone] if he needs to reschedule or if he has questions.

## 2018-08-10 NOTE — TELEPHONE ENCOUNTER
----- Message from Jeronimo Luna RN sent at 8/10/2018  9:56 AM CDT -----  Ju-    Would you mind calling pt to help schedule a hopsital f/u for this pt with RUBIA? In approximately 4 weeks to discuss his rosemary tube and his gallbladder.    Will you let him know his tube will most likely not be removed at the visit.    Thanks,  Jeronimo

## 2018-08-15 LAB
BACTERIA SPEC CULT: NORMAL
Lab: NORMAL
SPECIMEN SOURCE: NORMAL

## 2018-08-28 ENCOUNTER — TELEPHONE (OUTPATIENT)
Dept: SURGERY | Facility: CLINIC | Age: 73
End: 2018-08-28

## 2018-08-28 NOTE — TELEPHONE ENCOUNTER
Patient's wife called and requested a letter be sent to them with appointment information for their consult with Dr. Reardon on 09/12/2018. This writer will send one to the address in the chart.

## 2018-09-11 ENCOUNTER — TELEPHONE (OUTPATIENT)
Dept: SURGERY | Facility: CLINIC | Age: 73
End: 2018-09-11

## 2018-09-11 ENCOUNTER — PATIENT OUTREACH (OUTPATIENT)
Dept: CARE COORDINATION | Facility: CLINIC | Age: 73
End: 2018-09-11

## 2018-09-11 NOTE — TELEPHONE ENCOUNTER
Pre Visit Call and Assessment    Date of call:  09/11/2018    Phone numbers:  Home number on file [phone] (home)    Reached patient/confirmed appointment:  No - left message:   on voicemail  Patient care team/Primary provider:  Ananda Jones    Referred to:  Dr. Pallavi Reardon    Reason for visit: Hospital follow up

## 2018-09-12 ENCOUNTER — OFFICE VISIT (OUTPATIENT)
Dept: SURGERY | Facility: CLINIC | Age: 73
End: 2018-09-12
Payer: COMMERCIAL

## 2018-09-12 VITALS
BODY MASS INDEX: 23.79 KG/M2 | HEART RATE: 65 BPM | WEIGHT: 157 LBS | OXYGEN SATURATION: 97 % | TEMPERATURE: 98 F | SYSTOLIC BLOOD PRESSURE: 118 MMHG | HEIGHT: 68 IN | DIASTOLIC BLOOD PRESSURE: 75 MMHG

## 2018-09-12 DIAGNOSIS — K81.9 CHOLECYSTITIS: Primary | ICD-10-CM

## 2018-09-12 ASSESSMENT — ENCOUNTER SYMPTOMS
NAIL CHANGES: 0
EYE REDNESS: 0
HEMOPTYSIS: 0
COUGH: 1
EYE PAIN: 0
POSTURAL DYSPNEA: 0
SKIN CHANGES: 0
COUGH DISTURBING SLEEP: 0
WHEEZING: 0
SHORTNESS OF BREATH: 1
SNORES LOUDLY: 0
DOUBLE VISION: 0
EYE WATERING: 0
EYE IRRITATION: 0
POOR WOUND HEALING: 0
DYSPNEA ON EXERTION: 1
SPUTUM PRODUCTION: 1

## 2018-09-12 ASSESSMENT — PATIENT HEALTH QUESTIONNAIRE - PHQ9
10. IF YOU CHECKED OFF ANY PROBLEMS, HOW DIFFICULT HAVE THESE PROBLEMS MADE IT FOR YOU TO DO YOUR WORK, TAKE CARE OF THINGS AT HOME, OR GET ALONG WITH OTHER PEOPLE: SOMEWHAT DIFFICULT
SUM OF ALL RESPONSES TO PHQ QUESTIONS 1-9: 6
SUM OF ALL RESPONSES TO PHQ QUESTIONS 1-9: 6

## 2018-09-12 ASSESSMENT — PAIN SCALES - GENERAL: PAINLEVEL: EXTREME PAIN (8)

## 2018-09-12 NOTE — LETTER
"9/12/2018       RE: Abrahan Hagen  [address] [facility] [address]     Dear Colleague,    Thank you for referring your patient, Abrahan Hagen, to the [facility] GENERAL SURGERY at [facility]. Please see a copy of my visit note below.    Surgery Clinic Note    Reason for visit:  Cholecystitis, s/p IR placed cholecystostomy tube    HPI:  Abrahan Hagen is a 72 M with a history of esophageal atresia, s/p multiple abdominal surgeries including colonic interposition.  He was hospitalized in July with acute cholecystitis and at that time managed non operatively with antibiotics.  He was readmitted to the hospital on August 7th with worsening symptoms and a CT scan that showed persistent cholecystitis with a subhepatic abscess.  IR placed an 8F, transhepatic bilary drain at that time and aspirated purulent fluid.  He was discharged home on Cefdinir but discontinued this several days early as he was experiencing side effects including itchy eyes.    Currently, the patient denies fever, chills, nausea, emesis, abdominal pain, or other signs of infection.      PE  /75  Pulse 65  Temp 98  F (36.7  C)  Ht 1.727 m (5' 8\")  Wt 71.2 kg (157 lb)  SpO2 97%  BMI 23.87 kg/m2  NAD  RRR  CTAB  Abd soft, non distended, minimal tenderness to palpation near RUQ drain site;  Perc drain in place with creamy brown discharge.    A/P    I discussed with the patient the three options available at this point.     1.  Leave the tube in indefinitely - He has expressed that this is NOT a good option for him    2.  c tube study- if clear, tube can be removed without further intervention;  If not clear, will further  discuss surgery    3.  Surgery    Surgery is relatively high risk given patient anatomy, multiple prior operations, chronic inflammation.  Will likely require an open operation, with several days in the hospital and 4-6 weeks off work.     Given the above options, will schedule " sinogram/ c tube study in  IR and follow up with the results/ options after imaging is available.      Again, thank you for allowing me to participate in the care of your patient.      Sincerely,    Pallavi Reardon MD

## 2018-09-12 NOTE — PROGRESS NOTES
"Surgery Clinic Note    Reason for visit:  Cholecystitis, s/p IR placed cholecystostomy tube    HPI:  Abrahan Hagen is a 72 M with a history of esophageal atresia, s/p multiple abdominal surgeries including colonic interposition.  He was hospitalized in July with acute cholecystitis and at that time managed non operatively with antibiotics.  He was readmitted to the hospital on August 7th with worsening symptoms and a CT scan that showed persistent cholecystitis with a subhepatic abscess.  IR placed an 8F, transhepatic bilary drain at that time and aspirated purulent fluid.  He was discharged home on Cefdinir but discontinued this several days early as he was experiencing side effects including itchy eyes.    Currently, the patient denies fever, chills, nausea, emesis, abdominal pain, or other signs of infection.      PE  /75  Pulse 65  Temp 98  F (36.7  C)  Ht 1.727 m (5' 8\")  Wt 71.2 kg (157 lb)  SpO2 97%  BMI 23.87 kg/m2  NAD  RRR  CTAB  Abd soft, non distended, minimal tenderness to palpation near RUQ drain site;  Perc drain in place with creamy brown discharge.    A/P    I discussed with the patient the three options available at this point.     1.  Leave the tube in indefinitely - He has expressed that this is NOT a good option for him    2.  c tube study- if clear, tube can be removed without further intervention;  If not clear, will further  discuss surgery    3.  Surgery    Surgery is relatively high risk given patient anatomy, multiple prior operations, chronic inflammation.  Will likely require an open operation, with several days in the hospital and 4-6 weeks off work.     Given the above options, will schedule sinogram/ c tube study in  IR and follow up with the results/ options after imaging is available.    Pallavi Reardon  09/12/18.      Answers for HPI/ROS submitted by the patient on 9/12/2018   General Symptoms: No  Skin Symptoms: Yes  HENT Symptoms: No  EYE SYMPTOMS: Yes  HEART " SYMPTOMS: No  LUNG SYMPTOMS: Yes  INTESTINAL SYMPTOMS: No  URINARY SYMPTOMS: No  REPRODUCTIVE SYMPTOMS: No  SKELETAL SYMPTOMS: No  BLOOD SYMPTOMS: No  NERVOUS SYSTEM SYMPTOMS: No  MENTAL HEALTH SYMPTOMS: No  Changes in hair: No  Changes in moles/birth marks: No  Itching: Yes  Rashes: No  Changes in nails: No  Acne: No  Change in facial hair: No  Warts: No  Non-healing sores: No  Scarring: No  Flaking of skin: No  Color changes of hands/feet in cold : No  Sun sensitivity: No  Skin thickening: No  Eye pain: No  Vision loss: No  Dry eyes: Yes  Watery eyes: No  Eye bulging: No  Double vision: No  Flashing of lights: No  Spots: No  Floaters: Yes  Redness: No  Crossed eyes: No  Tunnel Vision: No  Yellowing of eyes: No  Eye irritation: No  Cough: Yes  Sputum or phlegm: Yes  Coughing up blood: No  Difficulty breating or shortness of breath: Yes  Snoring: No  Wheezing: No  Difficulty breathing on exertion: Yes  Nighttime Cough: No  Difficulty breathing when lying flat: No  PHQ-2 Score: 3  If you checked off any problems, how difficult have these problems made it for you to do your work, take care of things at home, or get along with other people?: Somewhat difficult  PHQ9 TOTAL SCORE: 6

## 2018-09-12 NOTE — NURSING NOTE
"Chief Complaint   Patient presents with     Surgical Followup       Vitals:    09/12/18 0752   BP: 118/75   Pulse: 65   Temp: 98  F (36.7  C)   SpO2: 97%   Weight: 157 lb   Height: 5' 8\"       Body mass index is 23.87 kg/(m^2).      Alessandro Vasquez, EMT                      "

## 2018-09-12 NOTE — MR AVS SNAPSHOT
After Visit Summary   9/12/2018    Abrahan Hagen    MRN: [id number]           Patient Information     Date Of Birth          1945        Visit Information        Provider Department      9/12/2018 8:00 AM Pallavi Reardon MD [facility] Surgery        Today's Diagnoses     Cholecystitis    -  1      Care Instructions    You had a tube placed to drain the gallbladder because it was infected.    There are three options at this point:    1.  Leave the tube in indefinitely - You have expressed that this is NOT a good option for you    2.  Dye study- if clear, tube can be removed without further intervention;  If not clear, will discuss surgery    3.  Surgery    Surgery is relatively high risk for you and will likely require an open operation, with several days in the hospital and 4-6 weeks off work.     Given the above options, will schedule you for a dye study in IR and follow up with the results/ options at that time.  We will call you with an appointment by the end of the week.                Follow-ups after your visit        Your next 10 appointments already scheduled     Sep 19, 2018 11:00 AM CDT   IR SINOGRAM INJECTION DIAGNOSTIC with UUIR1   [facility], [place], Interventional Radiology ([facility], [facility])    [address] [place] [id number] [phone]           You do not need to do anything special to prepare for this exam.  If you have any questions, please call the Imaging Department where you will have your exam. Directions, parking instructions, and other information are available on our website, [email or web address].              Future tests that were ordered for you today     Open Future Orders        Priority Expected Expires Ordered    IR Sinogram Injection Diagnostic Routine  9/12/2019 9/12/2018            Who to contact     Please call your clinic at [phone] to:    Ask questions about your health    Make  "or cancel appointments    Discuss your medicines    Learn about your test results    Speak to your doctor            Additional Information About Your Visit        Care EveryWhere ID     This is your Care EveryWhere ID. This could be used by other organizations to access your [place] medical records  [id number]        Your Vitals Were     Pulse Temperature Height Pulse Oximetry BMI (Body Mass Index)       65 98  F (36.7  C) 1.727 m (5' 8\") 97% 23.87 kg/m2        Blood Pressure from Last 3 Encounters:   09/12/18 118/75   08/09/18 133/77   07/09/18 116/65    Weight from Last 3 Encounters:   09/12/18 71.2 kg (157 lb)   08/08/18 73.9 kg (163 lb)   07/08/18 77.1 kg (170 lb)               Primary Care Provider Office Phone # Fax #    Ananda Jones [phone]       [facility] [address] [address]        Equal Access to Services     [facility] : Hadii aad ku hadasho Soomaali, waaxda luqadaha, qaybta kaalmada adeegyada, waxay tristain haydarriann naz davila . So [facility] [phone].    ATENCIÓN: Si habla español, tiene a sweeney disposición servicios gratuitos de asistencia lingüística. Ll[facility] [phone].    We comply with applicable federal civil rights laws and [place] laws. We do not discriminate on the basis of race, color, national origin, age, disability, sex, sexual orientation, or gender identity.            Thank you!     Thank you for choosing [facility] SURGERY  for your care. Our goal is always to provide you with excellent care. Hearing back from our patients is one way we can continue to improve our services. Please take a few minutes to complete the written survey that you may receive in the mail after your visit with us. Thank you!             Your Updated Medication List - Protect others around you: Learn how to safely use, store and throw away your medicines at www.disposemymeds.org.          This list is accurate as of 9/12/18  8:34 AM.  " Always use your most recent med list.                   Brand Name Dispense Instructions for use Diagnosis    acetaminophen-caffeine 500-65 MG Tabs    EXCEDRIN TENSION HEADACHE     Take 2 tablets by mouth every 6 hours as needed for mild pain        ALPHAGAN P 0.1 % ophthalmic solution   Generic drug:  brimonidine      Place 1 drop into both eyes 2 times daily        clotrimazole-betamethasone cream    LOTRISONE     Apply topically 2 times daily as needed        Ipratropium-Albuterol  MCG/ACT inhaler    COMBIVENT RESPIMAT     Inhale 1 puff into the lungs daily as needed        LUMIGAN 0.01 % Soln   Generic drug:  bimatoprost      Place 1 drop into both eyes At Bedtime        senna-docusate 8.6-50 MG per tablet    SENOKOT-S;PERICOLACE    100 tablet    Take 1 tablet by mouth 2 times daily    Acute cholecystitis       sodium chloride (PF) 0.9% PF flush     500 mL    3 mLs by Intracatheter route every 8 hours    Acute cholecystitis

## 2018-09-12 NOTE — PATIENT INSTRUCTIONS
You had a tube placed to drain the gallbladder because it was infected.    There are three options at this point:    1.  Leave the tube in indefinitely - You have expressed that this is NOT a good option for you    2.  Dye study- if clear, tube can be removed without further intervention;  If not clear, will discuss surgery    3.  Surgery    Surgery is relatively high risk for you and will likely require an open operation, with several days in the hospital and 4-6 weeks off work.     Given the above options, will schedule you for a dye study in IR and follow up with the results/ options at that time.  We will call you with an appointment by the end of the week.

## 2018-09-13 ASSESSMENT — PATIENT HEALTH QUESTIONNAIRE - PHQ9: SUM OF ALL RESPONSES TO PHQ QUESTIONS 1-9: 6

## 2018-09-17 ENCOUNTER — TELEPHONE (OUTPATIENT)
Dept: INTERVENTIONAL RADIOLOGY/VASCULAR | Facility: CLINIC | Age: 73
End: 2018-09-17

## 2018-09-19 ENCOUNTER — HOSPITAL ENCOUNTER (OUTPATIENT)
Facility: CLINIC | Age: 73
Discharge: HOME | End: 2018-09-19
Attending: SURGERY | Admitting: SURGERY
Payer: COMMERCIAL

## 2018-09-19 ENCOUNTER — APPOINTMENT (OUTPATIENT)
Dept: INTERVENTIONAL RADIOLOGY/VASCULAR | Facility: CLINIC | Age: 73
End: 2018-09-19
Attending: SURGERY
Payer: COMMERCIAL

## 2018-09-19 VITALS
DIASTOLIC BLOOD PRESSURE: 74 MMHG | RESPIRATION RATE: 14 BRPM | SYSTOLIC BLOOD PRESSURE: 146 MMHG | HEART RATE: 64 BPM | OXYGEN SATURATION: 97 %

## 2018-09-19 DIAGNOSIS — K81.9 CHOLECYSTITIS: ICD-10-CM

## 2018-09-19 PROCEDURE — 20501 NJX SINUS TRACT DIAGNOSTIC: CPT

## 2018-09-19 NOTE — PROCEDURES
Interventional Radiology Brief Post Procedure Note    Procedure: IR SINOGRAM INJECTION DIAGNOSTIC    Proceduralist: Eb Rucker MD    Assistant: None    Time Out: Prior to the start of the procedure and with procedural staff participation, I verbally confirmed the patient s identity using two indicators, relevant allergies, that the procedure was appropriate and matched the consent or emergent situation, and that the correct equipment/implants were available. Immediately prior to starting the procedure I conducted the Time Out with the procedural staff and re-confirmed the patient s name, procedure, and site/side. (The Joint Commission universal protocol was followed.)  Yes    Medications   Medication Event Details Admin User Admin Time       Sedation: None.     Findings: Successful cholangiogram, without evidence of a patent cystic duct.      Estimated Blood Loss: Minimal    Fluoroscopy Time:  minute(s)    SPECIMENS: None    Complications: 1. None     Condition: Stable    Plan: Long discussion with the patient.  He finds his life unacceptably hindered by the tube and bag.  We discussed the high likelihood of recurrent cholecystitis given his lack of a patent cystic duct.  He initially wanted to have the drain removed, however, he agreed to a capping trial.      Comments: See dictated procedure note for full details.    Eb Rucker MD

## 2018-09-19 NOTE — IR NOTE
Patient Name: Abrahan Hagen  Medical Record Number: [id number]  Today's Date: 9/19/2018    Procedure: Sinogram- biliary tube  Proceduralist: Dr. Rucker      Other Notes: Pt arrived to IR room 1 from [facility] waiting room . Pt denies any questions or concerns regarding procedure. Pt positioned supine and monitored per protocol. Pt tolerated procedure without any noted complications. Pt discharged to home with out incident Verbalized all instructions back. Dr. Rucker explained to patient about keeping drain in place. Drain will be capped per Dr. Rucker. Bag sent home with patient and explained to re-connect bag to drain if patient develops fever, chills vomiting and increasing abdominal pain. Supplies given to patient ( 3-way stopcock, drainage bag ) patient was flushing his biliary tube with 10 ml of normal saline flushes but instructed not flush due to capping

## 2018-09-26 ENCOUNTER — DOCUMENTATION ONLY (OUTPATIENT)
Dept: INTERVENTIONAL RADIOLOGY/VASCULAR | Facility: CLINIC | Age: 73
End: 2018-09-26

## 2018-09-26 NOTE — PROGRESS NOTES
"Received call from patient concerning sutures appearance.  Mr. Hagen is  status post cholecystostomy tube placement for acute cholecystitis on 8/7/2018. Last visit with IR  9/19/18. Reported, Cholecystostomy tube is in  good position , the cystic duct was not patent at this time. Cholecystomy drain is  left to gravity drainage, he is flushing bid with 10ml , and is recording outputs to be @40 ml dayly.   Abrahan was changing his dressings last night and noticed the sutures appeared to be \" more opened\" No Concerns with cholecystotomy tube  function.  Pt deny's fevers ( takes temp bid) or pain. We discussed securing the drain, the dressing, and securing the   drainage bag, all in order to prevent pulling on drain.  .   At this time Abrahan acknowledge that he  Understood, and would return for follow up appointment .    Nadia Golden IR [facility]  [phone] Call pager  [phone] pager                   "

## 2018-09-27 ENCOUNTER — TELEPHONE (OUTPATIENT)
Dept: INTERVENTIONAL RADIOLOGY/VASCULAR | Facility: CLINIC | Age: 73
End: 2018-09-27

## 2018-09-27 NOTE — TELEPHONE ENCOUNTER
Called and left a msg for pt to return my call regarding drain follow up.    Left direct line for him to return my call.    Yesenia Patrick RN, BSN  Interventional Radiology Nurse Coordinator   Phone: [phone]

## 2018-10-15 ENCOUNTER — TELEPHONE (OUTPATIENT)
Dept: INTERVENTIONAL RADIOLOGY/VASCULAR | Facility: CLINIC | Age: 73
End: 2018-10-15

## 2018-10-15 NOTE — TELEPHONE ENCOUNTER
Returning pt's VM and page.   He listed first number as [phone] with a non working VM.    Called pt's home phone again, and left a VM Asking that he return my phone call or paging me again.   Paging number provided.     Yesenia Patrick RN, BSN  Interventional Radiology Nurse Coordinator   Phone: [phone]

## 2018-10-24 ENCOUNTER — CARE COORDINATION (OUTPATIENT)
Dept: SURGERY | Facility: CLINIC | Age: 73
End: 2018-10-24

## 2018-10-24 NOTE — PROGRESS NOTES
Established Patient Telephone Reminder Call    Date of call:  10/24/18  Phone numbers:  Home number on file [phone] (home)    Reached patient/confirmed appointment:  No, tried x 2 and got busy signal then line would disconnect  Appointment with:   Dr. Pallavi Reardon  Reason for visit:  Follow up IR tube

## 2018-10-25 ENCOUNTER — OFFICE VISIT (OUTPATIENT)
Dept: SURGERY | Facility: CLINIC | Age: 73
End: 2018-10-25
Payer: COMMERCIAL

## 2018-10-25 VITALS
BODY MASS INDEX: 24.25 KG/M2 | HEART RATE: 52 BPM | WEIGHT: 160 LBS | HEIGHT: 68 IN | SYSTOLIC BLOOD PRESSURE: 141 MMHG | DIASTOLIC BLOOD PRESSURE: 75 MMHG | TEMPERATURE: 97.8 F | OXYGEN SATURATION: 97 %

## 2018-10-25 DIAGNOSIS — K81.9 CHOLECYSTITIS: Primary | ICD-10-CM

## 2018-10-25 ASSESSMENT — PAIN SCALES - GENERAL: PAINLEVEL: NO PAIN (0)

## 2018-10-25 NOTE — PATIENT INSTRUCTIONS
You met with Dr. Pallavi Reardon.      Today's visit instructions:    Return for surgery in 2019.  We will call you to discuss timing of appointment.         If you have questions please contact Jeronimo RN or Armida RN during regular clinic hours, Monday through Friday 7:30 AM - 4:00 PM, or you can contact us via [employer] at anytime.       If you have urgent needs after-hours, weekends, or holidays please call the hospital at [phone] and ask to speak with our on-call General Surgery Team.    Appointment [phone], option #1   Nurse Advice (Jeronimo or Armida): [phone]   Surgery Scheduler (Ju): [phone]  Fax: [phone]

## 2018-10-25 NOTE — LETTER
10/25/2018       RE: Abrahan Hagen  [address] [facility] [address]     Dear Colleague,    Thank you for referring your patient, Abrahan Hagen, to the [facility] GENERAL SURGERY at [facility]. Please see a copy of my visit note below.    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  These include an extracolonic interposition and emergent surgery to repair an ulceration of the colon.     He initially presented to [facility] in July with acute cholecystitis.  He was treated with antibiotics and discharged several days later after his symptoms appeared to improve.  However, he then represented in early August with increased RUQ pain, malaise, and fever to 102F.  At that time CT scan revealed worsening of his cholecystitis with perforation and  subhepatic abscess.    He had a c tube placed at that time and was again given antibiotics.  Signs of infection resolved and he was discharge home.  I saw the patient in clinic in mid September and send him for a c tube study at that time.  The revealed that the cystic duct was NOT patent, and the tube was left in place.      The patient states that his abdominal pain has mostly resolved, but that he still has discomfort around the tube, which pulls.  He denies fever or chills.  He is tolerating a regular diet with normal bowel function.  He denies nausea or emesis.  He states that he drains ~ 30 ml of fluid from his c tube daily;   Mostly the drainage is amble colored, sometimes creamy, and once or twice as been black.      The patient states that he has not remaining PTO/ disability for the year 2018, and so would like to delay any interventions/ procedures until after January 1, 2019.    /75  Pulse 52  Temp 97.8  F (36.6  C) (Oral)  " Ht 5' 8\"  Wt 160 lb  SpO2 97%  BMI 24.33 kg/m2  RRR  CTAB  Abd soft, non distended, non tender, C tube secured in RUQ, tess drainage, multiple abdominal scars.    A/P:  72M with recent severe cholecysitis with subhepatic abscess, s/p IR drainage with c tube.  Cystic duct not patent on most recent imaging.     Will plan to repeat sinogram/ drain study in late December /early January.    If cystic duct remains occluded, open subtotal cholecystectomy I early 2019.      Again, thank you for allowing me to participate in the care of your patient.      Sincerely,    Pallavi Reardon MD      "

## 2018-10-25 NOTE — PROGRESS NOTES
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  These include an extracolonic interposition and emergent surgery to repair an ulceration of the colon.     He initially presented to [facility] in July with acute cholecystitis.  He was treated with antibiotics and discharged several days later after his symptoms appeared to improve.  However, he then represented in early August with increased RUQ pain, malaise, and fever to 102F.  At that time CT scan revealed worsening of his cholecystitis with perforation and  subhepatic abscess.    He had a c tube placed at that time and was again given antibiotics.  Signs of infection resolved and he was discharge home.  I saw the patient in clinic in mid September and send him for a c tube study at that time.  The revealed that the cystic duct was NOT patent, and the tube was left in place.      The patient states that his abdominal pain has mostly resolved, but that he still has discomfort around the tube, which pulls.  He denies fever or chills.  He is tolerating a regular diet with normal bowel function.  He denies nausea or emesis.  He states that he drains ~ 30 ml of fluid from his c tube daily;   Mostly the drainage is amble colored, sometimes creamy, and once or twice as been black.      The patient states that he has not remaining PTO/ disability for the year 2018, and so would like to delay any interventions/ procedures until after January 1, 2019.    /75  Pulse 52  Temp 97.8  F (36.6  C) (Oral)  Ht 5' 8\"  Wt 160 lb  SpO2 97%  BMI 24.33 kg/m2  RRR  CTAB  Abd soft, non distended, non tender, C tube secured in RUQ, tess drainage, multiple abdominal scars.    A/P:  72M with recent severe cholecysitis with subhepatic abscess, s/p IR drainage with c tube.  Cystic duct not " patent on most recent imaging.     Will plan to repeat sinogram/ drain study in late December /early January.    If cystic duct remains occluded, open subtotal cholecystectomy I early 2019.    Pallavi Reardon  10/26/2018    Answers for HPI/ROS submitted by the patient on 10/25/2018   General Symptoms: No  Skin Symptoms: No  HENT Symptoms: No  EYE SYMPTOMS: No  HEART SYMPTOMS: No  LUNG SYMPTOMS: No  INTESTINAL SYMPTOMS: No  URINARY SYMPTOMS: No  REPRODUCTIVE SYMPTOMS: No  SKELETAL SYMPTOMS: No  BLOOD SYMPTOMS: No  NERVOUS SYSTEM SYMPTOMS: No  MENTAL HEALTH SYMPTOMS: No

## 2018-10-25 NOTE — NURSING NOTE
"Chief Complaint   Patient presents with     Consult     RETURN        Vitals:    10/25/18 0800   BP: 141/75   Pulse: 52   Temp: 97.8  F (36.6  C)   TempSrc: Oral   SpO2: 97%   Weight: 160 lb   Height: 5' 8\"       Body mass index is 24.33 kg/(m^2).      Neal Russell on 10/25/2018 at 8:06 AM                          "

## 2018-10-25 NOTE — LETTER
October 25, 2018    RE:  Abrahan Hagen                              [address] [place] [address]            To whom it may concern:    Abrahan Hagen is under my professional care for a surgical issue.   He may return to work with the following: The employee is ABLE to return to work today.    When the patient returns to work, the following restrictions apply until January 1, 2019:  A) Bend: Frequently (4-8 hours)  B) Squat: Frequently (4-8 hours)  C) Walk/Stand: Frequently (4-8 hours)  D) Reach Above Shoulders: Frequently (4-8 hours)  E) Lift, carry, push, and pull no more than 50 lbs.      Sincerely,        Pallavi Reardon MD

## 2018-10-25 NOTE — MR AVS SNAPSHOT
"              After Visit Summary   10/25/2018    Abrahan Hagen    MRN: [id number]           Patient Information     Date Of Birth          1945        Visit Information        Provider Department      10/25/2018 8:00 AM Pallavi Reardon MD [facility] General Surgery        Care Instructions    You met with Dr. Pallavi Reardon.      Today's visit instructions:    Return for surgery in 2019.  We will call you to discuss timing of appointment.         If you have questions please contact Jeronimo RN or Armida RN during regular clinic hours, Monday through Friday 7:30 AM - 4:00 PM, or you can contact us via [employer] at anytime.       If you have urgent needs after-hours, weekends, or holidays please call the hospital at [phone] and ask to speak with our on-call General Surgery Team.    Appointment [phone], option #1   Nurse Advice (Jeronimo or Armida): [phone]   Surgery Scheduler ([place]): [phone]  Fax: [phone]                      Follow-ups after your visit        Who to contact     Please call your clinic at [phone] to:    Ask questions about your health    Make or cancel appointments    Discuss your medicines    Learn about your test results    Speak to your doctor            Additional Information About Your Visit        Care EveryWhere ID     This is your Care EveryWhere ID. This could be used by other organizations to access your [place] medical records  [id number]        Your Vitals Were     Pulse Temperature Height Pulse Oximetry BMI (Body Mass Index)       52 97.8  F (36.6  C) (Oral) 1.727 m (5' 8\") 97% 24.33 kg/m2        Blood Pressure from Last 3 Encounters:   10/25/18 141/75   18 146/74   18 118/75    Weight from Last 3 Encounters:   10/25/18 72.6 kg (160 lb)   18 71.2 kg (157 lb)   18 73.9 kg (163 lb)              Today, you had the following     No orders found for display       Primary Care Provider Office Phone # Fax #    Ananda " SHYAM Jones -[phone]       [facility] [address] [place] [facility] [address]        Equal Access to Services     CIERRA CASTRO : Maura norm curran juliana Yi, barron freeman, qadatta kajoelleda joey, nora madrigal lakorinsusan arabella. So [facility] [phone].    ATENCIÓN: Si habla español, tiene a sweeney disposición servicios gratuitos de asistencia lingüística. Llame al [phone].    We comply with applicable federal civil rights laws and [place] laws. We do not discriminate on the basis of race, color, national origin, age, disability, sex, sexual orientation, or gender identity.            Thank you!     Thank you for choosing [facility] SURGERY  for your care. Our goal is always to provide you with excellent care. Hearing back from our patients is one way we can continue to improve our services. Please take a few minutes to complete the written survey that you may receive in the mail after your visit with us. Thank you!             Your Updated Medication List - Protect others around you: Learn how to safely use, store and throw away your medicines at www.disposemymeds.org.          This list is accurate as of 10/25/18  8:36 AM.  Always use your most recent med list.                   Brand Name Dispense Instructions for use Diagnosis    acetaminophen-caffeine 500-65 MG Tabs    EXCEDRIN TENSION HEADACHE     Take 2 tablets by mouth every 6 hours as needed for mild pain        ALPHAGAN P 0.1 % ophthalmic solution   Generic drug:  brimonidine      Place 1 drop into both eyes 2 times daily        clotrimazole-betamethasone cream    LOTRISONE     Apply topically 2 times daily as needed        Ipratropium-Albuterol  MCG/ACT inhaler    COMBIVENT RESPIMAT     Inhale 1 puff into the lungs daily as needed        LUMIGAN 0.01 % Soln   Generic drug:  bimatoprost      Place 1 drop into both eyes At Bedtime        senna-docusate 8.6-50 MG per tablet     SENOKOT-S;PERICOLACE    100 tablet    Take 1 tablet by mouth 2 times daily    Acute cholecystitis       sodium chloride (PF) 0.9% PF flush     500 mL    3 mLs by Intracatheter route every 8 hours    Acute cholecystitis

## 2018-10-31 ENCOUNTER — TELEPHONE (OUTPATIENT)
Dept: INTERVENTIONAL RADIOLOGY/VASCULAR | Facility: CLINIC | Age: 73
End: 2018-10-31

## 2018-10-31 NOTE — TELEPHONE ENCOUNTER
Called and left a msg for pt to return my call regarding the request for more normal saline flushes.     Left direct line for him to return my call.    Yesenia Patrick RN, BSN  Interventional Radiology Nurse Coordinator   Phone: [phone]

## 2018-11-01 ENCOUNTER — PATIENT OUTREACH (OUTPATIENT)
Dept: SURGERY | Facility: CLINIC | Age: 73
End: 2018-11-01

## 2018-11-01 DIAGNOSIS — K81.9 CHOLECYSTITIS: Primary | ICD-10-CM

## 2018-11-01 NOTE — PROGRESS NOTES
Patient was recently seen in the General Surgery Clinic by Dr. Reardon.  Planning rosemary cystectomy in 2019.  Patients case was presented at GI Conference today. Recommend CT with contrast (contrast through c tube as well) and IR sinogram in December.  Dr. Reardon would like to see him in the clinic after studies done.  Orders placed.

## 2018-11-02 ENCOUNTER — TELEPHONE (OUTPATIENT)
Dept: INTERVENTIONAL RADIOLOGY/VASCULAR | Facility: CLINIC | Age: 73
End: 2018-11-02

## 2018-11-02 NOTE — TELEPHONE ENCOUNTER
Returning pt's phone call in regards to his drain and flushes.   He does need more flushes in which we will order more from [employer]    10cc prefilled normal saline flushes to flush drain once a day     He states that he was instructed to return some time in December for his CT And sinogram    He's states that he did stop recording flushes at this time due to a steady output of ~3oz. I've asked him to continue with keeping up with the recorded output still. He states that he will continue to do so. He also states that he's had trouble at times in regards to     He states that he is otherwise doing fine.     Also informed him that we will see what we can do regarding his appt and then call him back as I do see the orders in the chart.     He will continue to keep us informed.    *CALLED [employer] and inquired on normal saline flushes as well as Eder 600D bags.       Yesenia Patrick, RN, BSN  Interventional Radiology Nurse Coordinator   Phone: [phone]

## 2018-11-05 ENCOUNTER — TEAM CONFERENCE (OUTPATIENT)
Dept: INTERVENTIONAL RADIOLOGY/VASCULAR | Facility: CLINIC | Age: 73
End: 2018-11-05

## 2018-11-05 NOTE — TELEPHONE ENCOUNTER
Called [employer] and after a 15 min hold left a msg for them regarding pt needing [place] 600D bags to be added onto his Normal saline flush order from last week.     Left direct line for them to return my call.     Yesenia Patrick RN, BSN  Interventional Radiology Nurse Coordinator   Phone: [phone]

## 2018-11-06 NOTE — TELEPHONE ENCOUNTER
Called [facility] again to order bags for patient.     Eder 600D drainage bags qty: 2 to be shipped out to patient.   Once again informed her that I called and left a VM, In which she checked and this has not been entered into the pt's chart.   I've asked that if there I any out of pocket pay , they are to contact the patient.     She agrees to plan.     Yesenia Patrick RN, BSN  Interventional Radiology Nurse Coordinator   Phone: [phone]

## 2018-11-12 ENCOUNTER — TELEPHONE (OUTPATIENT)
Dept: INTERVENTIONAL RADIOLOGY/VASCULAR | Facility: CLINIC | Age: 73
End: 2018-11-12

## 2018-11-12 NOTE — TELEPHONE ENCOUNTER
Pt calling to inquire on his supplies.     He states that the connector where the tube and bag is leaking. He states that this started last night. He is flushing the drain with normal saline ~ once daily with normal saline 10ccs.     I asked if the site was constantly leaking or intermittent.   He states that it's intermittent as he notices that his pants are wet.     He currently has a piece of guaze around the connector site. I've asked him if there was a hole in the tube in which he denies but feels that a new bag would be helpful.    Daily Output: 1.5-2oz    Informed him that I will go ahead and schedule for his follow up in December. He will need a CT scan and then a sinogram to follow. I'll schedule this for the same day and let Dr Campos's team know as well since I do see that there are orders in his chart already.    In the mean time, I am sending out an Release of Info form for him to sign so that I can send medical records to [facility] as I was informed by them that they need to do a pre-cert for his insurance to see if they would cover the NS syringes as well as the Eder drainage bags. I will be sending this out today and he is to sign and mail it back to me. He verbalized understanding of this information.     I also informed him that he can call me at anytime. Once I do get his f/u with IR scheduled I will call him back.     Otherwise he has no other complaints.     Yesenia Patrick, RN, BSN  Interventional Radiology Nurse Coordinator   Phone: [phone]

## 2018-11-13 ENCOUNTER — HOSPITAL ENCOUNTER (OUTPATIENT)
Facility: CLINIC | Age: 73
End: 2018-11-13
Attending: SURGERY | Admitting: SURGERY
Payer: COMMERCIAL

## 2018-11-21 DIAGNOSIS — K81.0 ACUTE CHOLECYSTITIS: Primary | ICD-10-CM

## 2018-11-29 ENCOUNTER — TELEPHONE (OUTPATIENT)
Dept: INTERVENTIONAL RADIOLOGY/VASCULAR | Facility: CLINIC | Age: 73
End: 2018-11-29

## 2018-11-29 NOTE — TELEPHONE ENCOUNTER
Called pt and left a VM on his home phone.   Informed him that I did receive a msg regarding them needing supplies. Informed him that all information was sent to [facility] so they should be receiving a phone call.     I've asked them to return my call to further discuss.     Left direct line for them.     Yesenia Patrick, RN, BSN  Interventional Radiology Nurse Coordinator   Phone: [phone]

## 2018-12-03 ENCOUNTER — TELEPHONE (OUTPATIENT)
Dept: SURGERY | Facility: CLINIC | Age: 73
End: 2018-12-03

## 2018-12-03 ENCOUNTER — HOSPITAL ENCOUNTER (OUTPATIENT)
Dept: CT IMAGING | Facility: CLINIC | Age: 73
Discharge: HOME | End: 2018-12-03
Attending: SURGERY | Admitting: SURGERY
Payer: COMMERCIAL

## 2018-12-03 ENCOUNTER — HOSPITAL ENCOUNTER (OUTPATIENT)
Dept: CT IMAGING | Facility: CLINIC | Age: 73
End: 2018-12-03
Attending: SURGERY | Admitting: SURGERY
Payer: COMMERCIAL

## 2018-12-03 DIAGNOSIS — K81.9 CHOLECYSTITIS: ICD-10-CM

## 2018-12-03 PROCEDURE — 25000128 H RX IP 250 OP 636: Performed by: RADIOLOGY

## 2018-12-03 PROCEDURE — 25000125 ZZHC RX 250: Performed by: RADIOLOGY

## 2018-12-03 PROCEDURE — 74177 CT ABD & PELVIS W/CONTRAST: CPT

## 2018-12-03 PROCEDURE — 25000128 H RX IP 250 OP 636: Performed by: SURGERY

## 2018-12-03 PROCEDURE — 76080 X-RAY EXAM OF FISTULA: CPT

## 2018-12-03 RX ORDER — IOPAMIDOL 755 MG/ML
3 INJECTION, SOLUTION INTRAVASCULAR ONCE
Status: COMPLETED | OUTPATIENT
Start: 2018-12-03 | End: 2018-12-03

## 2018-12-03 RX ORDER — IOPAMIDOL 755 MG/ML
99 INJECTION, SOLUTION INTRAVASCULAR ONCE
Status: COMPLETED | OUTPATIENT
Start: 2018-12-03 | End: 2018-12-03

## 2018-12-03 RX ADMIN — IOPAMIDOL 3 ML: 755 INJECTION, SOLUTION INTRAVENOUS at 10:49

## 2018-12-03 RX ADMIN — IOPAMIDOL 99 ML: 755 INJECTION, SOLUTION INTRAVENOUS at 10:46

## 2018-12-03 RX ADMIN — SODIUM CHLORIDE, PRESERVATIVE FREE 75 ML: 5 INJECTION INTRAVENOUS at 10:46

## 2018-12-03 NOTE — TELEPHONE ENCOUNTER
----- Message from Armida Kelly RN sent at 11/1/2018 12:47 PM CDT -----  Regarding: RE: Appt      ----- Message -----     From: Armida Kelly RN     Sent: 11/1/2018  12:46 PM       To: Armida Kelly RN, Ju Reece  Subject: Appt                                             Ju,    Can you please arrange:    CT scan and IR drain study in December.  After completed Dr. Reardon would like to see him in the clinic for discuss ongoing plan.    Thank you!   Armida

## 2018-12-03 NOTE — TELEPHONE ENCOUNTER
Per task, this writer called the patient and left a message with an appointment for him to see Dr. Pallavi Reardon on 12/19/2018 at 8:30 am. He was asked to call [phone] to confirm.

## 2018-12-07 ENCOUNTER — TELEPHONE (OUTPATIENT)
Dept: SURGERY | Facility: CLINIC | Age: 73
End: 2018-12-07

## 2018-12-07 NOTE — TELEPHONE ENCOUNTER
Patient called and left a message confirming appointment on 12/19/2018 would work for him. He did not request a call back.

## 2018-12-17 ENCOUNTER — PATIENT OUTREACH (OUTPATIENT)
Dept: CARE COORDINATION | Facility: CLINIC | Age: 73
End: 2018-12-17

## 2018-12-18 ENCOUNTER — TELEPHONE (OUTPATIENT)
Dept: SURGERY | Facility: CLINIC | Age: 73
End: 2018-12-18

## 2018-12-18 NOTE — TELEPHONE ENCOUNTER
Established Patient Telephone Reminder Call    Date of call:  12/18/18  Phone numbers:  Home number on file [phone] (home)    Reached patient/confirmed appointment:  No - left message:   on voicemail  Appointment with:   Dr. Pallavi Reardon  Reason for visit:  CT and IR singogram

## 2018-12-19 ENCOUNTER — OFFICE VISIT (OUTPATIENT)
Dept: SURGERY | Facility: CLINIC | Age: 73
End: 2018-12-19
Payer: COMMERCIAL

## 2018-12-19 VITALS
BODY MASS INDEX: 24.22 KG/M2 | OXYGEN SATURATION: 100 % | HEART RATE: 64 BPM | SYSTOLIC BLOOD PRESSURE: 143 MMHG | HEIGHT: 68 IN | DIASTOLIC BLOOD PRESSURE: 77 MMHG | WEIGHT: 159.8 LBS

## 2018-12-19 DIAGNOSIS — K81.1 CHRONIC CHOLECYSTITIS: Primary | ICD-10-CM

## 2018-12-19 ASSESSMENT — MIFFLIN-ST. JEOR: SCORE: 1444.35

## 2018-12-19 ASSESSMENT — PAIN SCALES - GENERAL: PAINLEVEL: NO PAIN (0)

## 2018-12-19 NOTE — PATIENT INSTRUCTIONS
You met with Dr. Pallavi Reardon.      Today's visit instructions:    Dr. Reardon is placing an order for an IR study to evaluate your drain. Our office will call you with a time/date once available. She would then like to see you in clinic after. This can be done the same day- looking at Wednesday, .        If you have questions please contact Jeronimo RN or Armida RN during regular clinic hours, Monday through Friday 7:30 AM - 4:00 PM, or you can contact us via Swarm at anytime.       If you have urgent needs after-hours, weekends, or holidays please call the hospital at [phone] and ask to speak with our on-call General Surgery Team.    Appointment [phone], option #1   Nurse Advice (Jeronimo or Armida): [phone]   Surgery Scheduler (Ju): [phone]  Fax: [phone]

## 2018-12-19 NOTE — LETTER
"12/19/2018       RE: Abrahan Hagen  [address] [facility] [address]     Dear Colleague,    Thank you for referring your patient, Abrahan Hagen, to the [facility] GENERAL SURGERY at [facility]. Please see a copy of my visit note below.    Surgery Clinic Note    Interval History:    No adverse events or changes in health since last visit.  The patient denies abdominal pain other than some irritation at this drain site.  He states that recently the drainage from his tube has increased and is more black that brown.    The patient denies nausea, emesis, fever or chills.  He continues to have a good appetite. BMs normal, more soft than hard lately.    /77   Pulse 64   Ht 1.727 m (5' 8\")   Wt 72.5 kg (159 lb 12.8 oz)   SpO2 100%   BMI 24.30 kg/m     RRR  CTAB  Abd soft, non distended, non tender;  Perc drain site clean, without erythema or drainage.      A/P:  IR sinogram (fluoroscopy) in 2-3 weeks  If this is clear, will remove tube  Also gave the patient the option of clamping tube today, but he has concerns about this causing symptoms or potentially causing recurrent cholecystits  and would like to hold off on clamping for now.      Pallavi Reardon      Answers for HPI/ROS submitted by the patient on 12/19/2018   General Symptoms: No  Skin Symptoms: No  HENT Symptoms: No  EYE SYMPTOMS: No  HEART SYMPTOMS: No  LUNG SYMPTOMS: No  INTESTINAL SYMPTOMS: No  URINARY SYMPTOMS: No  REPRODUCTIVE SYMPTOMS: No  SKELETAL SYMPTOMS: No  BLOOD SYMPTOMS: No  NERVOUS SYSTEM SYMPTOMS: No  MENTAL HEALTH SYMPTOMS: No          "

## 2018-12-19 NOTE — PROGRESS NOTES
"Surgery Clinic Note    Interval History:    No adverse events or changes in health since last visit.  The patient denies abdominal pain other than some irritation at this drain site.  He states that recently the drainage from his tube has increased and is more black that brown.    The patient denies nausea, emesis, fever or chills.  He continues to have a good appetite. BMs normal, more soft than hard lately.    /77   Pulse 64   Ht 1.727 m (5' 8\")   Wt 72.5 kg (159 lb 12.8 oz)   SpO2 100%   BMI 24.30 kg/m    RRR  CTAB  Abd soft, non distended, non tender;  Perc drain site clean, without erythema or drainage.      A/P:  IR sinogram (fluoroscopy) in 2-3 weeks  If this is clear, will remove tube  Also gave the patient the option of clamping tube today, but he has concerns about this causing symptoms or potentially causing recurrent cholecystits  and would like to hold off on clamping for now.      Pallavi Reardon      Answers for HPI/ROS submitted by the patient on 12/19/2018   General Symptoms: No  Skin Symptoms: No  HENT Symptoms: No  EYE SYMPTOMS: No  HEART SYMPTOMS: No  LUNG SYMPTOMS: No  INTESTINAL SYMPTOMS: No  URINARY SYMPTOMS: No  REPRODUCTIVE SYMPTOMS: No  SKELETAL SYMPTOMS: No  BLOOD SYMPTOMS: No  NERVOUS SYSTEM SYMPTOMS: No  MENTAL HEALTH SYMPTOMS: No    "

## 2018-12-19 NOTE — NURSING NOTE
"Chief Complaint   Patient presents with     Consult     RETURN - CT and IR sinogram        Vitals:    12/19/18 0820   BP: 143/77   Pulse: 64   SpO2: 100%   Weight: 72.5 kg (159 lb 12.8 oz)   Height: 1.727 m (5' 8\")       Body mass index is 24.3 kg/m .      Neal Russell, EMT on 12/19/2018 at 8:23 AM                          "

## 2018-12-21 ENCOUNTER — TELEPHONE (OUTPATIENT)
Dept: SURGERY | Facility: CLINIC | Age: 73
End: 2018-12-21

## 2018-12-21 NOTE — TELEPHONE ENCOUNTER
----- Message from Jeronimo Luna, RN sent at 12/20/2018  9:00 AM CST -----  Would you mind calling pt to update him of future appts?    7:45 check in at the [facility] waiting room for an 8 am procedure on 1/9. He will get a call from an RN prior to that appt to discuss details.    F/u with RUBIA after, at 9 at the [facility]. Aware he may be a few minutes late.    Thanks,  Jeronimo

## 2018-12-21 NOTE — TELEPHONE ENCOUNTER
Called patient to confirm scheduled appointments with IR and  Dr. Pallavi Reardon, there was no answer.  Left message with his appointment details for 01/9/2019 and this writer's direct line [phone].

## 2019-01-04 ENCOUNTER — HOSPITAL ENCOUNTER (OUTPATIENT)
Facility: CLINIC | Age: 74
Discharge: HOME | End: 2019-01-04
Attending: RADIOLOGY | Admitting: RADIOLOGY
Payer: COMMERCIAL

## 2019-01-04 ENCOUNTER — APPOINTMENT (OUTPATIENT)
Dept: INTERVENTIONAL RADIOLOGY/VASCULAR | Facility: CLINIC | Age: 74
End: 2019-01-04
Attending: RADIOLOGY
Payer: COMMERCIAL

## 2019-01-04 ENCOUNTER — APPOINTMENT (OUTPATIENT)
Dept: MEDSURG UNIT | Facility: CLINIC | Age: 74
End: 2019-01-04
Attending: RADIOLOGY
Payer: COMMERCIAL

## 2019-01-04 VITALS
HEIGHT: 68 IN | TEMPERATURE: 97.7 F | HEART RATE: 67 BPM | DIASTOLIC BLOOD PRESSURE: 77 MMHG | WEIGHT: 156 LBS | BODY MASS INDEX: 23.64 KG/M2 | RESPIRATION RATE: 16 BRPM | OXYGEN SATURATION: 95 % | SYSTOLIC BLOOD PRESSURE: 144 MMHG

## 2019-01-04 DIAGNOSIS — K81.1 CHRONIC CHOLECYSTITIS: Primary | ICD-10-CM

## 2019-01-04 DIAGNOSIS — K81.1 CHRONIC CHOLECYSTITIS: ICD-10-CM

## 2019-01-04 PROCEDURE — 27210905 ZZH KIT CR7

## 2019-01-04 PROCEDURE — C1769 GUIDE WIRE: HCPCS

## 2019-01-04 PROCEDURE — 25500064 ZZH RX 255 OP 636: Performed by: RADIOLOGY

## 2019-01-04 PROCEDURE — 25000125 ZZHC RX 250: Performed by: STUDENT IN AN ORGANIZED HEALTH CARE EDUCATION/TRAINING PROGRAM

## 2019-01-04 PROCEDURE — 47536 EXCHANGE BILIARY DRG CATH: CPT

## 2019-01-04 PROCEDURE — 25000128 H RX IP 250 OP 636: Performed by: STUDENT IN AN ORGANIZED HEALTH CARE EDUCATION/TRAINING PROGRAM

## 2019-01-04 PROCEDURE — 40000166 ZZH STATISTIC PP CARE STAGE 1

## 2019-01-04 PROCEDURE — C1729 CATH, DRAINAGE: HCPCS

## 2019-01-04 RX ORDER — SODIUM CHLORIDE 9 MG/ML
INJECTION, SOLUTION INTRAVENOUS CONTINUOUS
Status: DISCONTINUED | OUTPATIENT
Start: 2019-01-04 | End: 2019-01-04 | Stop reason: HOSPADM

## 2019-01-04 RX ORDER — IODIXANOL 320 MG/ML
50 INJECTION, SOLUTION INTRAVASCULAR ONCE
Status: COMPLETED | OUTPATIENT
Start: 2019-01-04 | End: 2019-01-04

## 2019-01-04 RX ORDER — NICOTINE POLACRILEX 4 MG
15-30 LOZENGE BUCCAL
Status: DISCONTINUED | OUTPATIENT
Start: 2019-01-04 | End: 2019-01-04 | Stop reason: HOSPADM

## 2019-01-04 RX ORDER — DEXTROSE MONOHYDRATE 25 G/50ML
25-50 INJECTION, SOLUTION INTRAVENOUS
Status: DISCONTINUED | OUTPATIENT
Start: 2019-01-04 | End: 2019-01-04 | Stop reason: HOSPADM

## 2019-01-04 RX ORDER — LIDOCAINE 40 MG/G
CREAM TOPICAL
Status: DISCONTINUED | OUTPATIENT
Start: 2019-01-04 | End: 2019-01-04 | Stop reason: HOSPADM

## 2019-01-04 RX ORDER — CLINDAMYCIN PHOSPHATE 900 MG/50ML
900 INJECTION, SOLUTION INTRAVENOUS
Status: COMPLETED | OUTPATIENT
Start: 2019-01-04 | End: 2019-01-04

## 2019-01-04 RX ADMIN — SODIUM CHLORIDE: 9 INJECTION, SOLUTION INTRAVENOUS at 14:30

## 2019-01-04 RX ADMIN — IODIXANOL 10 ML: 320 INJECTION, SOLUTION INTRAVASCULAR at 15:31

## 2019-01-04 RX ADMIN — CLINDAMYCIN IN 5 PERCENT DEXTROSE 900 MG: 18 INJECTION, SOLUTION INTRAVENOUS at 14:30

## 2019-01-04 RX ADMIN — GENTAMICIN SULFATE 120 MG: 40 INJECTION, SOLUTION INTRAMUSCULAR; INTRAVENOUS at 15:35

## 2019-01-04 ASSESSMENT — MIFFLIN-ST. JEOR: SCORE: 1427.11

## 2019-01-04 NOTE — PROGRESS NOTES
Patient tolerated recovery stage well. No sedation given but had to stay to finish his IV antibiotic. VSS, R drain site clean/dry/intact, Biliary tube us capped, no hematoma, and denies pain. Patient tolerated PO food and fluids. Teaching was done and discharge instructions were given. Patient ambulated, voided, and PIV was removed. Patient discharged from the hospital ambulatory with his wife.

## 2019-01-04 NOTE — PROGRESS NOTES
Interventional Radiology Brief Post Procedure Note    Procedure: IR BILIARY TUBE CHANGE    Proceduralist: Eb Rucker MD    Assistant: IR Fellow Physician, Junito Vaca MD, and Radiology Resident Physician, Isa Cantu MD    Time Out: Prior to the start of the procedure and with procedural staff participation, I verbally confirmed the patient s identity using two indicators, relevant allergies, that the procedure was appropriate and matched the consent or emergent situation, and that the correct equipment/implants were available. Immediately prior to starting the procedure I conducted the Time Out with the procedural staff and re-confirmed the patient s name, procedure, and site/side. (The Joint Commission universal protocol was followed.)  Yes    Medications   Medication Event Details Admin User Admin Time       Sedation: None. Local Anesthestic used    Findings: Contrast injection through the existing biliary drain demonstrates patent cystic duct. Drain was exchanged over wire and capped.    Estimated Blood Loss: None    Fluoroscopy Time:  3.6 minute(s)    SPECIMENS: None    Complications: 1. None     Condition: Stable    Plan:   Keep drain capped. Return as scheduled on 1/9/2019 for possible drain removal.    Comments: See dictated procedure note for full details.    Isa Cantu MD

## 2019-01-04 NOTE — IR NOTE
Patient Name: Abrahan Hagen  Medical Record Number: [id number]  Today's Date: 1/4/2019    Procedure: biliary tube change  Proceduralist: Isa Cantu MD; Eb Rucker MD; Junito Vaca MD    Procedure start time: 1510  Puncture time: 1516  Procedure end time: 1540    Report given to: Jackie OROSCO 2A  : none    Other Notes: Pt arrived to IR room 1 from . Consent reviewed. Pt denies any questions or concerns regarding procedure. Pt positioned supine and monitored per protocol. Area prepped, images obtained. Tube changed and capped. Upon departure- alert, oriented, calm. Respirations eupneic on room air. Pt tolerated procedure without any noted complications. Pt transferred back to  for recovery.

## 2019-01-04 NOTE — H&P
H&P - Interventional Radiology Pre-Procedure Sedation Assessment   Time of Assessment: 2:17 PM    Procedure: Biliary Drain Exchange    Sedation and procedural consent: Risks, benefits and alternatives were discussed with Patient    PO Intake: Patient is not appropriately NPO and therefore sedation will not be used    ASA Class: Class 2 - MILD SYSTEMIC DISEASE, NO ACUTE PROBLEMS, NO FUNCTIONAL LIMITATIONS.      Focused history and physical completed prior to procedure.     HPI: Patient is here for cracked biliary drain which needs to be exchanged. Three days ago, patient noted there was decreased output from the drain, and shortly afterward discovered a crack in the drain near the connection with the bag tubing. Since that time, the patient states there has been leakage from the crack. He was able to wrap the tube with gauze to limit leakage. Now he feels as though the only drainage from the tube is from the saline he uses to flush the tube.     No new updates to the patient's prior history and physical are noted. Patient notes generalized fatigue, dizziness, chills, and a brief episode of epigastric pain two days ago, otherwise 10 point ROS is negative. No new labs are indicated at this time.    Exam:  Gen: Pleasant man sitting up in bed, no acute distress  Lungs: Clear to auscultation bilaterally  Heart: Normal heart sounds and rate   Abdomen: RUQ biliary drain in place connected to leg bag with yellow drainage in the bag  Skin: Warm and well-perfused      I have reviewed the lab findings, diagnostic data, and medications. I have determined this patient to be an appropriate candidate for the planned procedure and have reassessed the patient IMMEDIATELY PRIOR to the procedure.    A/P: 74 yo M here for malfunctioning biliary drain. Plan for injection and exchange of tube without sedation due to patient not being sufficiently NPO. Patient is agreeable and understands the plan and has consented to the  procedure.    Isa Cantu MD

## 2019-01-07 ENCOUNTER — TELEPHONE (OUTPATIENT)
Dept: INTERVENTIONAL RADIOLOGY/VASCULAR | Facility: CLINIC | Age: 74
End: 2019-01-07

## 2019-01-08 ENCOUNTER — PATIENT OUTREACH (OUTPATIENT)
Dept: SURGERY | Facility: CLINIC | Age: 74
End: 2019-01-08

## 2019-01-08 NOTE — PROGRESS NOTES
Established Patient Telephone Reminder Call    Date of call:  01/08/19  Phone numbers:  Home number on file [phone] (home)    Reached patient/confirmed appointment:  No - left message:   on voicemail  Appointment with:   Dr. Pallavi Reardon  Reason for visit:  Sinogram and follow up appt

## 2019-01-09 ENCOUNTER — HOSPITAL ENCOUNTER (OUTPATIENT)
Dept: INTERVENTIONAL RADIOLOGY/VASCULAR | Facility: CLINIC | Age: 74
End: 2019-01-09
Attending: SURGERY
Payer: COMMERCIAL

## 2019-01-09 ENCOUNTER — HOSPITAL ENCOUNTER (OUTPATIENT)
Facility: CLINIC | Age: 74
Discharge: HOME | End: 2019-01-09
Attending: RADIOLOGY | Admitting: RADIOLOGY
Payer: COMMERCIAL

## 2019-01-09 ENCOUNTER — OFFICE VISIT (OUTPATIENT)
Dept: SURGERY | Facility: CLINIC | Age: 74
End: 2019-01-09
Payer: COMMERCIAL

## 2019-01-09 VITALS
HEIGHT: 68 IN | WEIGHT: 159.6 LBS | OXYGEN SATURATION: 96 % | DIASTOLIC BLOOD PRESSURE: 74 MMHG | TEMPERATURE: 97.4 F | HEART RATE: 75 BPM | SYSTOLIC BLOOD PRESSURE: 134 MMHG | BODY MASS INDEX: 24.19 KG/M2

## 2019-01-09 VITALS
OXYGEN SATURATION: 97 % | RESPIRATION RATE: 16 BRPM | SYSTOLIC BLOOD PRESSURE: 153 MMHG | HEART RATE: 64 BPM | DIASTOLIC BLOOD PRESSURE: 98 MMHG

## 2019-01-09 DIAGNOSIS — K81.1 CHRONIC CHOLECYSTITIS: ICD-10-CM

## 2019-01-09 DIAGNOSIS — K81.1 CHOLECYSTITIS, CHRONIC: Primary | ICD-10-CM

## 2019-01-09 DIAGNOSIS — K81.0 ACUTE CHOLECYSTITIS: Primary | ICD-10-CM

## 2019-01-09 PROCEDURE — G0463 HOSPITAL OUTPT CLINIC VISIT: HCPCS

## 2019-01-09 PROCEDURE — 25000125 ZZHC RX 250: Performed by: PHYSICIAN ASSISTANT

## 2019-01-09 RX ADMIN — LIDOCAINE HYDROCHLORIDE 1 ML: 10 INJECTION, SOLUTION EPIDURAL; INFILTRATION; INTRACAUDAL; PERINEURAL at 08:13

## 2019-01-09 ASSESSMENT — ENCOUNTER SYMPTOMS
HOARSE VOICE: 0
PALPITATIONS: 0
DIARRHEA: 0
NIGHT SWEATS: 0
BLOATING: 0
HALLUCINATIONS: 0
SINUS CONGESTION: 0
EXERCISE INTOLERANCE: 0
HEARTBURN: 1
CONSTIPATION: 0
CHILLS: 0
SMELL DISTURBANCE: 0
FATIGUE: 1
HYPOTENSION: 0
TASTE DISTURBANCE: 0
BLOOD IN STOOL: 0
WEIGHT LOSS: 0
SLEEP DISTURBANCES DUE TO BREATHING: 0
SYNCOPE: 0
ABDOMINAL PAIN: 1
SINUS PAIN: 0
WEIGHT GAIN: 0
BOWEL INCONTINENCE: 0
LIGHT-HEADEDNESS: 0
ORTHOPNEA: 0
LEG PAIN: 0
ALTERED TEMPERATURE REGULATION: 0
INCREASED ENERGY: 0
VOMITING: 0
NECK MASS: 0
SORE THROAT: 0
FEVER: 0
JAUNDICE: 0
HYPERTENSION: 0
RECTAL PAIN: 0
DECREASED APPETITE: 0
POLYPHAGIA: 0
TROUBLE SWALLOWING: 0
POLYDIPSIA: 0
NAUSEA: 1

## 2019-01-09 ASSESSMENT — MIFFLIN-ST. JEOR: SCORE: 1443.44

## 2019-01-09 ASSESSMENT — PAIN SCALES - GENERAL: PAINLEVEL: NO PAIN (0)

## 2019-01-09 NOTE — NURSING NOTE
"Chief Complaint   Patient presents with     Clinic Care Coordination - Follow-up     Patient here today for drain follow up, had IR procedure before this appt.       Vitals:    01/09/19 0909   BP: 134/74   BP Location: Left arm   Patient Position: Chair   Cuff Size: Adult Regular   Pulse: 75   Temp: 97.4  F (36.3  C)   TempSrc: Oral   SpO2: 96%   Weight: 159 lb 9.6 oz   Height: 5' 8\"       Body mass index is 24.27 kg/m .    Amy CARLSON LPN                  "

## 2019-01-09 NOTE — PROGRESS NOTES
"Surgery Clinic Follow Up Note    Reason for visit:   F/u for history of cholecystitis, s/p perc cholecystostomy tube placement in August 2018    Interval history:  On 1/03/2019 the patient underwent a sinogram through his cholecystostomy tube that demonstrated a patent cystic duct.  His tube has since been capped.  He reports no abdominal pain, nausea, vomiting, fever,  or changes in bowel habits.  However, he did have an episode of severe abdominal pain over the weekend after his tube was clamped.  This resolved spontaneously.      PE:  /74 (BP Location: Left arm, Patient Position: Chair, Cuff Size: Adult Regular)   Pulse 75   Temp 97.4  F (36.3  C) (Oral)   Ht 1.727 m (5' 8\")   Wt 72.4 kg (159 lb 9.6 oz)   SpO2 96%   BMI 24.27 kg/m      RRR  CTAB  Abd soft, non distended, non tender; Multiple well healed abdominal incisions.  Perc drain site clean, without erythema or drainage.      A/P:  S/p perc cholecystostomy tube placement for severe cholecystitis with abscess  Most recent sinogram demonstrates a patent cystic duct.  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    Offered to remove cholecystostomy tube removed in clinic today.  However, patient refused given recent episode of severe abdominal pain while tube was clamp.  He fears that the pain may return if the tube is removed. At this time, the patient states that he is willing to live with the tube in place and with tube exchanged q~3 months by MARCUS Reardon  01/09/19           Answers for HPI/ROS submitted by the patient on 1/9/2019   General Symptoms: Yes  Skin Symptoms: No  HENT Symptoms: Yes  EYE SYMPTOMS: No  HEART SYMPTOMS: Yes  LUNG SYMPTOMS: No  INTESTINAL SYMPTOMS: Yes  URINARY SYMPTOMS: No  REPRODUCTIVE SYMPTOMS: No  SKELETAL SYMPTOMS: No  BLOOD SYMPTOMS: No  NERVOUS SYSTEM " SYMPTOMS: No  MENTAL HEALTH SYMPTOMS: No  Fever: No  Loss of appetite: No  Weight loss: No  Weight gain: No  Fatigue: Yes  Night sweats: No  Chills: No  Increased stress: No  Excessive hunger: No  Excessive thirst: No  Feeling hot or cold when others believe the temperature is normal: No  Loss of height: No  Post-operative complications: No  Surgical site pain: No  Hallucinations: No  Change in or Loss of Energy: No  Hyperactivity: No  Confusion: No  Ear pain: No  Ear discharge: No  Hearing loss: Yes  Tinnitus: Yes  Nosebleeds: No  Congestion: No  Sinus pain: No  Trouble swallowing: No   Voice hoarseness: No  Mouth sores: No  Sore throat: No  Tooth pain: No  Gum tenderness: No  Bleeding gums: No  Change in taste: No  Change in sense of smell: No  Dry mouth: No  Hearing aid used: No  Neck lump: No  Chest pain or pressure: Yes  Fast or irregular heartbeat: No  Pain in legs with walking: No  Trouble breathing while lying down: No  Fingers or toes appear blue: No  High blood pressure: No  Low blood pressure: No  Fainting: No  Murmurs: No  Pacemaker: No  Varicose veins: No  Edema or swelling: No  Wake up at night with shortness of breath: No  Light-headedness: No  Exercise intolerance: No  Heart burn or indigestion: Yes  Nausea: Yes  Vomiting: No  Abdominal pain: Yes  Bloating: No  Constipation: No  Diarrhea: No  Blood in stool: No  Black stools: No  Rectal or Anal pain: No  Fecal incontinence: No  Yellowing of skin or eyes: No  Vomit with blood: No  Change in stools: No

## 2019-01-09 NOTE — PATIENT INSTRUCTIONS
You met with Dr. Pallavi Reardon.      Today's visit instructions:    Return to the Surgery Clinic on an as needed basis.    If you have questions please contact Jeronimo RN or Armida RN during regular clinic hours, Monday through Friday 7:30 AM - 4:00 PM, or you can contact us via MedSocket at anytime.       If you have urgent needs after-hours, weekends, or holidays please call the hospital at [phone] and ask to speak with our on-call General Surgery Team.    Appointment [phone], option #1   Nurse Advice (Jeronimo or Armida): [phone]   Surgery Scheduler (Ju): [phone]  Fax: [phone]

## 2019-01-09 NOTE — PROCEDURES
Patient with calculous cholecystitis prompting percutaneous cholecystostomy drain placement 8/7/2018. He is a poor surgical candidate. The drain was exchanged because the hub was cracked on 1/4/2019. On cholangiogram it was noted the cystic duct was open to the duodenum so the tube was capped and the patient was instructed to follow up today for possible removal of drain. The patient reports a single, 15-minute long episode of epigastric pain while eating chips and mild salsa 3 days ago, but denies fever or prolonged right upper quadrant pain. He has an appointment with Dr. Reardon of surgery following this appointment.    After discussion with the patient, it was decided to leave the gall bladder drain in place and capped. He will see the surgeon today and come up with surgical plan. If the patient has his gall bladder removed, they will remove the drain at time of gall bladder removal. If he remains a poor surgical candidate, he will need to return to IR for routine biliary tube changes every 3 months.    The tube will be left capped with instructions to open to drainage if symptoms of cholecystitis develop. The patient does not mind living with a capped tube but adamant about not having a drainage bag.    Lenny Blanchard PA-C  Interventional Radiology  [phone]

## 2019-01-09 NOTE — PROGRESS NOTES
Interventional Radiology Intra-procedural Nursing Note    Patient Name: Abrahan Hagen  Medical Record Number: [id number]  Today's Date: January 9, 2019         Start Time: 0750  End of procedure time: 0820  Procedure: Sinogram and drain removal  Fire Safety Score: 0    Consent Review/Timeout Performed by: Swapnil Blanchard PA-C  Procedure Performed By: Swapnil Blanchard PA-C    Procedure start time: 0750  Procedure end time: 0820    Time pt departs:  0825    Other Notes:  Alert male transported via cart from [facility] to IR Procedure Room 2 for planned intervention.  ID band confirmed and patient acknowledges understanding of planned procedure. Patient repositioned to procedure table via hover-mat and positioned supine.  Patient prepped and draped per policy see VS flowsheet, MAR for further information.      After discussion with Provider, and calls placed to patients surgeon regarding plan of care and treatment options. Plan developed with patient and drain left in place until patient is seen by surgeon later today.  Tube remains capped at this time.   Site cleansed and dressed per protocol.  Patient with plans to follow up with Primary Provider after IR appointment today.    Patient condition post procedure is stable.   Patient returned to [facility] for immediate discharge.    Xiomara Condon RN

## 2019-01-09 NOTE — LETTER
"1/9/2019       RE: Abrahan Hagen  [address] [facility] [address]     Dear Colleague,    Thank you for referring your patient, Abrahan Hagen, to the [facility] GENERAL SURGERY at [facility]. Please see a copy of my visit note below.    Surgery Clinic Follow Up Note    Reason for visit:   F/u for history of cholecystitis, s/p perc cholecystostomy tube placement in August 2018    Interval history:  On 1/03/2019 the patient underwent a sinogram through his cholecystostomy tube that demonstrated a patent cystic duct.  His tube has since been capped.  He reports no abdominal pain, nausea, vomiting, fever,  or changes in bowel habits.  However, he did have an episode of severe abdominal pain over the weekend after his tube was clamped.  This resolved spontaneously.      PE:  /74 (BP Location: Left arm, Patient Position: Chair, Cuff Size: Adult Regular)   Pulse 75   Temp 97.4  F (36.3  C) (Oral)   Ht 1.727 m (5' 8\")   Wt 72.4 kg (159 lb 9.6 oz)   SpO2 96%   BMI 24.27 kg/m       RRR  CTAB  Abd soft, non distended, non tender; Multiple well healed abdominal incisions.  Perc drain site clean, without erythema or drainage.      A/P:  S/p perc cholecystostomy tube placement for severe cholecystitis with abscess  Most recent sinogram demonstrates a patent cystic duct.  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    Offered to remove cholecystostomy tube removed in clinic today.  However, patient refused given recent episode of severe abdominal pain while tube was clamp.  He fears that the pain may return if the tube is removed. At this time, the patient states that he is willing to live with the tube in place and with tube exchanged q~3 months by IR.    Again, thank you for allowing me to participate in the care of your patient.  "     Sincerely,    Pallavi Reardon MD

## 2019-01-11 ENCOUNTER — PATIENT OUTREACH (OUTPATIENT)
Dept: SURGERY | Facility: CLINIC | Age: 74
End: 2019-01-11

## 2019-01-11 NOTE — LETTER
January 11, 2019      TO: Abrahan Hagen  [address] [place] [id number]         To Whom It May Concern,    Abrahan Hagen is under our professional care.  Mr. Hagen may resume regular activity and has not been placed on any lifting restrictions.      Sincerely,        Armida Kelly RN, BSN, CNOR, RNFA, CBCN on behalf of Pallavi Reardon MD

## 2019-04-01 ENCOUNTER — TEAM CONFERENCE (OUTPATIENT)
Dept: VASCULAR SURGERY | Facility: CLINIC | Age: 74
End: 2019-04-01

## 2019-04-01 NOTE — TELEPHONE ENCOUNTER
Called pt to f/u on request for needing more clau 600 d leg bags.    Called [employer] to further inquire in which I was informed that he has been canceling orders.     When inquiring on which orders they were referring to, she stated that pt has not gotten any supplies for quite some time.    Informed her that if pt is to call in to request for supplies, if he can just call the customer service line in which she indicated that they would be able to fill the request with no authorization.     *called pt and left a msg informing him that I have callled [employer] regarding his request for more leg bags.  Left a msg on pt's home phone.    Informed him of the information above.     Left our call back should he have any other questions.    Also did leave Adele's direct number as well.     Yesenia Patrick, RN, BSN  Interventional Radiology Nurse Coordinator   Phone: [phone]

## 2019-04-11 ENCOUNTER — PATIENT OUTREACH (OUTPATIENT)
Dept: SURGERY | Facility: CLINIC | Age: 74
End: 2019-04-11

## 2019-04-11 NOTE — PROGRESS NOTES
Patient calling requesting copies of medical records for 2nd opinion.  Patient given contact information for [facility].

## (undated) RX ORDER — LIDOCAINE HYDROCHLORIDE 10 MG/ML
INJECTION, SOLUTION EPIDURAL; INFILTRATION; INTRACAUDAL; PERINEURAL
Status: DISPENSED
Start: 2019-01-04

## (undated) RX ORDER — LIDOCAINE HYDROCHLORIDE 10 MG/ML
INJECTION, SOLUTION EPIDURAL; INFILTRATION; INTRACAUDAL; PERINEURAL
Status: DISPENSED
Start: 2019-01-09

## (undated) RX ORDER — LIDOCAINE HYDROCHLORIDE 10 MG/ML
INJECTION, SOLUTION EPIDURAL; INFILTRATION; INTRACAUDAL; PERINEURAL
Status: DISPENSED
Start: 2018-08-07

## (undated) RX ORDER — LIDOCAINE HYDROCHLORIDE 10 MG/ML
INJECTION, SOLUTION EPIDURAL; INFILTRATION; INTRACAUDAL; PERINEURAL
Status: DISPENSED
Start: 2018-07-08

## (undated) RX ORDER — CLINDAMYCIN PHOSPHATE 900 MG/50ML
INJECTION, SOLUTION INTRAVENOUS
Status: DISPENSED
Start: 2019-01-04

## (undated) RX ORDER — FENTANYL CITRATE 50 UG/ML
INJECTION, SOLUTION INTRAMUSCULAR; INTRAVENOUS
Status: DISPENSED
Start: 2018-08-07

## (undated) RX ORDER — SODIUM CHLORIDE 9 MG/ML
INJECTION, SOLUTION INTRAVENOUS
Status: DISPENSED
Start: 2019-01-04